# Patient Record
Sex: FEMALE | Race: WHITE | NOT HISPANIC OR LATINO
[De-identification: names, ages, dates, MRNs, and addresses within clinical notes are randomized per-mention and may not be internally consistent; named-entity substitution may affect disease eponyms.]

---

## 2022-04-22 PROBLEM — Z00.00 ENCOUNTER FOR PREVENTIVE HEALTH EXAMINATION: Status: ACTIVE | Noted: 2022-04-22

## 2022-04-25 ENCOUNTER — APPOINTMENT (OUTPATIENT)
Dept: GYNECOLOGIC ONCOLOGY | Facility: CLINIC | Age: 72
End: 2022-04-25
Payer: MEDICARE

## 2022-04-25 VITALS
OXYGEN SATURATION: 98 % | BODY MASS INDEX: 24.77 KG/M2 | HEIGHT: 58 IN | DIASTOLIC BLOOD PRESSURE: 66 MMHG | SYSTOLIC BLOOD PRESSURE: 135 MMHG | WEIGHT: 118 LBS | TEMPERATURE: 97.2 F | HEART RATE: 65 BPM

## 2022-04-25 DIAGNOSIS — Z76.89 PERSONS ENCOUNTERING HEALTH SERVICES IN OTHER SPECIFIED CIRCUMSTANCES: ICD-10-CM

## 2022-04-25 DIAGNOSIS — Z78.9 OTHER SPECIFIED HEALTH STATUS: ICD-10-CM

## 2022-04-25 DIAGNOSIS — Z87.891 PERSONAL HISTORY OF NICOTINE DEPENDENCE: ICD-10-CM

## 2022-04-25 PROCEDURE — 99205 OFFICE O/P NEW HI 60 MIN: CPT | Mod: 25

## 2022-04-25 PROCEDURE — 57100 BIOPSY VAGINAL MUCOSA SIMPLE: CPT

## 2022-04-29 ENCOUNTER — OUTPATIENT (OUTPATIENT)
Dept: OUTPATIENT SERVICES | Facility: HOSPITAL | Age: 72
LOS: 1 days | End: 2022-04-29
Payer: MEDICAID

## 2022-04-29 DIAGNOSIS — C52 MALIGNANT NEOPLASM OF VAGINA: ICD-10-CM

## 2022-05-03 ENCOUNTER — RESULT REVIEW (OUTPATIENT)
Age: 72
End: 2022-05-03

## 2022-05-03 LAB — CORE LAB BIOPSY: NORMAL

## 2022-05-03 PROCEDURE — 88321 CONSLTJ&REPRT SLD PREP ELSWR: CPT

## 2022-05-04 ENCOUNTER — RESULT REVIEW (OUTPATIENT)
Age: 72
End: 2022-05-04

## 2022-05-04 PROCEDURE — 88321 CONSLTJ&REPRT SLD PREP ELSWR: CPT

## 2022-05-05 LAB — SURGICAL PATHOLOGY STUDY: SIGNIFICANT CHANGE UP

## 2022-05-10 ENCOUNTER — APPOINTMENT (OUTPATIENT)
Dept: NUCLEAR MEDICINE | Facility: CLINIC | Age: 72
End: 2022-05-10
Payer: MEDICARE

## 2022-05-10 ENCOUNTER — OUTPATIENT (OUTPATIENT)
Dept: OUTPATIENT SERVICES | Facility: HOSPITAL | Age: 72
LOS: 1 days | End: 2022-05-10

## 2022-05-10 PROCEDURE — 78815 PET IMAGE W/CT SKULL-THIGH: CPT | Mod: 26,PI

## 2022-05-16 ENCOUNTER — APPOINTMENT (OUTPATIENT)
Dept: GYNECOLOGIC ONCOLOGY | Facility: CLINIC | Age: 72
End: 2022-05-16
Payer: MEDICARE

## 2022-05-16 PROCEDURE — 99214 OFFICE O/P EST MOD 30 MIN: CPT

## 2022-05-17 ENCOUNTER — NON-APPOINTMENT (OUTPATIENT)
Age: 72
End: 2022-05-17

## 2022-05-21 ENCOUNTER — OUTPATIENT (OUTPATIENT)
Dept: OUTPATIENT SERVICES | Facility: HOSPITAL | Age: 72
LOS: 1 days | Discharge: HOME | End: 2022-05-21
Payer: MEDICARE

## 2022-05-21 DIAGNOSIS — C52 MALIGNANT NEOPLASM OF VAGINA: ICD-10-CM

## 2022-05-24 ENCOUNTER — APPOINTMENT (OUTPATIENT)
Dept: HEMATOLOGY ONCOLOGY | Facility: CLINIC | Age: 72
End: 2022-05-24
Payer: MEDICARE

## 2022-05-24 ENCOUNTER — RESULT REVIEW (OUTPATIENT)
Age: 72
End: 2022-05-24

## 2022-05-24 ENCOUNTER — NON-APPOINTMENT (OUTPATIENT)
Age: 72
End: 2022-05-24

## 2022-05-24 VITALS
HEART RATE: 76 BPM | SYSTOLIC BLOOD PRESSURE: 154 MMHG | RESPIRATION RATE: 16 BRPM | WEIGHT: 118.23 LBS | TEMPERATURE: 98 F | BODY MASS INDEX: 24.82 KG/M2 | DIASTOLIC BLOOD PRESSURE: 75 MMHG | HEIGHT: 57.99 IN | OXYGEN SATURATION: 96 %

## 2022-05-24 LAB
BASOPHILS # BLD AUTO: 0.04 K/UL — SIGNIFICANT CHANGE UP (ref 0–0.2)
BASOPHILS NFR BLD AUTO: 0.8 % — SIGNIFICANT CHANGE UP (ref 0–2)
EOSINOPHIL # BLD AUTO: 0.21 K/UL — SIGNIFICANT CHANGE UP (ref 0–0.5)
EOSINOPHIL NFR BLD AUTO: 4.2 % — SIGNIFICANT CHANGE UP (ref 0–6)
HCT VFR BLD CALC: 37.6 % — SIGNIFICANT CHANGE UP (ref 34.5–45)
HGB BLD-MCNC: 12 G/DL — SIGNIFICANT CHANGE UP (ref 11.5–15.5)
IMM GRANULOCYTES NFR BLD AUTO: 0.2 % — SIGNIFICANT CHANGE UP (ref 0–1.5)
LYMPHOCYTES # BLD AUTO: 1.48 K/UL — SIGNIFICANT CHANGE UP (ref 1–3.3)
LYMPHOCYTES # BLD AUTO: 29.7 % — SIGNIFICANT CHANGE UP (ref 13–44)
MCHC RBC-ENTMCNC: 30.8 PG — SIGNIFICANT CHANGE UP (ref 27–34)
MCHC RBC-ENTMCNC: 31.9 G/DL — LOW (ref 32–36)
MCV RBC AUTO: 96.4 FL — SIGNIFICANT CHANGE UP (ref 80–100)
MONOCYTES # BLD AUTO: 0.45 K/UL — SIGNIFICANT CHANGE UP (ref 0–0.9)
MONOCYTES NFR BLD AUTO: 9 % — SIGNIFICANT CHANGE UP (ref 2–14)
NEUTROPHILS # BLD AUTO: 2.8 K/UL — SIGNIFICANT CHANGE UP (ref 1.8–7.4)
NEUTROPHILS NFR BLD AUTO: 56.1 % — SIGNIFICANT CHANGE UP (ref 43–77)
NRBC # BLD: 0 /100 WBCS — SIGNIFICANT CHANGE UP (ref 0–0)
PLATELET # BLD AUTO: 141 K/UL — LOW (ref 150–400)
RBC # BLD: 3.9 M/UL — SIGNIFICANT CHANGE UP (ref 3.8–5.2)
RBC # FLD: 12.6 % — SIGNIFICANT CHANGE UP (ref 10.3–14.5)
WBC # BLD: 4.99 K/UL — SIGNIFICANT CHANGE UP (ref 3.8–10.5)
WBC # FLD AUTO: 4.99 K/UL — SIGNIFICANT CHANGE UP (ref 3.8–10.5)

## 2022-05-24 PROCEDURE — 93010 ELECTROCARDIOGRAM REPORT: CPT

## 2022-05-24 PROCEDURE — 99205 OFFICE O/P NEW HI 60 MIN: CPT

## 2022-05-24 RX ORDER — POLYETHYLENE GLYCOL-3350 AND ELECTROLYTES WITH FLAVOR PACK 240; 5.84; 2.98; 6.72; 22.72 G/278.26G; G/278.26G; G/278.26G; G/278.26G; G/278.26G
240 POWDER, FOR SOLUTION ORAL
Qty: 4000 | Refills: 0 | Status: DISCONTINUED | COMMUNITY
Start: 2022-02-27 | End: 2022-05-24

## 2022-05-24 RX ORDER — CIPROFLOXACIN HYDROCHLORIDE 500 MG/1
500 TABLET, FILM COATED ORAL
Qty: 10 | Refills: 0 | Status: COMPLETED | COMMUNITY
Start: 2022-03-24 | End: 2022-05-24

## 2022-05-25 LAB
ALBUMIN SERPL ELPH-MCNC: 4.5 G/DL
ALP BLD-CCNC: 50 U/L
ALT SERPL-CCNC: 27 U/L
ANION GAP SERPL CALC-SCNC: 12 MMOL/L
APTT BLD: 32.5 SEC
AST SERPL-CCNC: 32 U/L
BILIRUB SERPL-MCNC: 0.4 MG/DL
BUN SERPL-MCNC: 12 MG/DL
CALCIUM SERPL-MCNC: 9.4 MG/DL
CHLORIDE SERPL-SCNC: 104 MMOL/L
CO2 SERPL-SCNC: 24 MMOL/L
CREAT SERPL-MCNC: 0.69 MG/DL
EGFR: 94 ML/MIN/1.73M2
GLUCOSE SERPL-MCNC: 95 MG/DL
HAV IGM SER QL: NONREACTIVE
HBV CORE IGM SER QL: NONREACTIVE
HBV SURFACE AG SER QL: NONREACTIVE
HCV AB SER QL: NONREACTIVE
HCV S/CO RATIO: 0.35 S/CO
INR PPP: 0.93 RATIO
MAGNESIUM SERPL-MCNC: 2.2 MG/DL
POTASSIUM SERPL-SCNC: 4.2 MMOL/L
PROT SERPL-MCNC: 6.9 G/DL
PT BLD: 11 SEC
SODIUM SERPL-SCNC: 140 MMOL/L

## 2022-05-25 NOTE — HISTORY OF PRESENT ILLNESS
[Disease: _____________________] : Disease: [unfilled] [AJCC Stage: ____] : AJCC Stage: [unfilled] [de-identified] : Latasha is a 71 year old woman referred by Dr. Loredo for newly diagnosed vaginal cancer.\par In February 2022, she had spotting. She went for annual check up. She had colonoscopy and mammogram. Spotting continued, and she saw Gyn (Dr. More). He did biopsy and referred her Dr. Loredo.\par She had another biopsy and PET was obtained.\par \par 4/19/22 CT Abdomen/Pelvis \par Heterogeneously enhancing mass in the vagina measuring 5.3 x 2.2 x 3.6 cm. 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 No pain. No bowel or bladder issues.\par Appetite is normal. No change in weight.\par \par No significant past medical history.\par No known family history.\par \par Last gyn exam was in 1997. She had hysterectomy in 1997 for fibroids.\par , no children.  She has 9 siblings.\par Ex- smoker and drinking many years ago. \par She works from home. \par  [de-identified] : Patient has some vaginal staining, no pain.\par No recent weight loss or gain.\par No bowel or bladder issues.

## 2022-05-25 NOTE — CONSULT LETTER
[Dear  ___] : Dear  [unfilled], [Consult Letter:] : I had the pleasure of evaluating your patient, [unfilled]. [Consult Closing:] : Thank you very much for allowing me to participate in the care of this patient.  If you have any questions, please do not hesitate to contact me. [Sincerely,] : Sincerely, [DrSouth  ___] : Dr. HILL

## 2022-05-25 NOTE — REASON FOR VISIT
[Initial Consultation] : an initial consultation [Other: _____] : [unfilled] [FreeTextEntry2] : vaginal cancer.

## 2022-05-26 ENCOUNTER — APPOINTMENT (OUTPATIENT)
Dept: RADIATION ONCOLOGY | Facility: CLINIC | Age: 72
End: 2022-05-26
Payer: MEDICARE

## 2022-05-26 ENCOUNTER — OUTPATIENT (OUTPATIENT)
Dept: OUTPATIENT SERVICES | Facility: HOSPITAL | Age: 72
LOS: 1 days | Discharge: HOME | End: 2022-05-26
Payer: MEDICARE

## 2022-05-26 VITALS
SYSTOLIC BLOOD PRESSURE: 154 MMHG | WEIGHT: 121.25 LBS | TEMPERATURE: 98 F | BODY MASS INDEX: 25.35 KG/M2 | RESPIRATION RATE: 16 BRPM | OXYGEN SATURATION: 99 % | DIASTOLIC BLOOD PRESSURE: 73 MMHG | HEART RATE: 68 BPM

## 2022-05-26 DIAGNOSIS — Z78.9 OTHER SPECIFIED HEALTH STATUS: ICD-10-CM

## 2022-05-26 DIAGNOSIS — Z87.42 PERSONAL HISTORY OF OTHER DISEASES OF THE FEMALE GENITAL TRACT: ICD-10-CM

## 2022-05-26 PROCEDURE — 77263 THER RADIOLOGY TX PLNG CPLX: CPT

## 2022-05-26 PROCEDURE — 99205 OFFICE O/P NEW HI 60 MIN: CPT | Mod: 25,GC

## 2022-05-26 NOTE — REASON FOR VISIT
[Other: _____] : [unfilled] [Consideration of Curative Therapy] : consideration of curative therapy for [Other: ___] : [unfilled]

## 2022-05-27 NOTE — END OF VISIT
[] : Resident [FreeTextEntry3] : I saw and examined this patient on the date of service with my assigned resident physician, Dr. Eliza Osborn. I was involved in all procedures and radiographic assessments. I personally confirmed pertinent history and exam findings and reviewed the patient's diagnosis and plan with them.\par \par  [FreeTextEntry2] : 71F with vaginal cancer, at least 4 cm lesion at introitus to mid vagina on right wall to rectovaginal septum. SCC noted on a vaginal biopsy 4/5/22, well-to-moderately differentiated (previously on outside biopsy from 4/14/22, was felt to be SCC on review). CT A/P 4/14/22 showed a 5.3 x 2.2 x 3.6 cm mass in the vagina. PET/CT 5/10/22 showed SUV 13.0 avidity in vagina. No avid LAD or distant disease. MR staging pending. \par \par We would recommend treatment with definitive chemoradiation and discussed external beam and brachytherapy approaches, as well as the risks and benefits associated with these treatments. We will make sure she has the MRI for baseline staging and would repeat it on therapy to assess response and determine method of boost at the end (brachytherapy/external beam approach). At this time she would need either an interstitial or external beam boost, but we will reassess. She wishes to proceed; informed consent obtained and we will schedule her for simulation and follow up on MRI. We will coordinate with her medical oncologist.

## 2022-05-27 NOTE — DISEASE MANAGEMENT
[Clinical] : TNM Stage: c [IB] : IB [FreeTextEntry4] : at least FIGO IB; pending MRI staging [TTNM] : 1b [NTNM] : 0 [MTNM] : 0

## 2022-05-27 NOTE — HISTORY OF PRESENT ILLNESS
[FreeTextEntry1] : Latasha Martin is a 72yo F w/ history of hysterectomy 40 years ago in the [facility], now presenting with vaginal mass, SCC on biopsy, cT1b at least Stage IB (MR staging pending). Patient presents to discuss radiation therapy.\par \par Brief Oncological History:\par In 2/2022, she noticed mucous vaginal discharge that was blood tinged and subsequently had PMB. No GYN care since hysteroctomy 40 yers ago.\par \par Patient seen by outside GYN and biopsy of vaginal mass showing fragments of  dysplastic, dyskeratotic squamous epithelium,\par consistent with keratinizing squamous cell carcinoma. Pathology reviewed at [facility]. \par \par 4/19/22 - CT A/P showing heterogeneously enhancing mass in the vagina measuring 5.3 x 2.2 x 3.6 cm. This could be the redundant vaginal mucosa itself that is abnormally thickened and not necessarily an intraluminal mass. \par \par Patient seen by Dr. Loredo and additional biopsy taken on 4/25/2022, pathology showing SCC, well to moderately differentiated. \par \par PET CT on 5/10/22 showing increased FDG activity in the vagina, not well delineated on CT in the absence of intravenous contrast, with no evidence of metastatic disease.\par \par MR Pelvis ordered by GynOnc for RT planning. Not yet scheduled.\par \par Today:\par Notes pink vaginal discharge. Uses 2-3 pads/day. Denies abdominal pain, dysuria or bowel complaints.

## 2022-05-27 NOTE — REVIEW OF SYSTEMS
[Nocturia] : nocturia [Vaginal Discharge] : vaginal discharge [Dysmenorrhea/Abn Vaginal Bleeding] : dysmenorrhea/abnormal vaginal bleeding [Anxiety] : anxiety [Negative] : Heme/Lymph [Eye Pain] : no eye pain [Visual Disturbances] : no visual disturbances [Urinary Frequency] : no change in urinary frequency [Confused] : no confusion [Dizziness] : no dizziness [FreeTextEntry8] : pink vaginal discharge

## 2022-05-27 NOTE — PHYSICAL EXAM
[Normal] : well developed, well nourished, in no acute distress [Sclera] : the sclera and conjunctiva were normal [Outer Ear] : the ears and nose were normal in appearance [] : no respiratory distress [Heart Rate And Rhythm] : heart rate and rhythm were normal [Abdomen Soft] : soft [Abdomen Tenderness] : non-tender [Normal] : normal external genitalia [Inguinal Lymph Nodes Enlarged Bilaterally] : inguinal [Musculoskeletal - Swelling] : no joint swelling [Skin Color & Pigmentation] : normal skin color and pigmentation [No Focal Deficits] : no focal deficits [Oriented To Time, Place, And Person] : oriented to person, place, and time [Respiration, Rhythm And Depth] : normal respiratory rhythm and effort [Exaggerated Use Of Accessory Muscles For Inspiration] : no accessory muscle use [de-identified] : mass at introitus, palpable approx 4cm mass along the R vaginal wall. Unable to visualize the cervix on speculum exam

## 2022-06-07 PROCEDURE — 77470 SPECIAL RADIATION TREATMENT: CPT | Mod: 26

## 2022-06-07 PROCEDURE — 77332 RADIATION TREATMENT AID(S): CPT | Mod: 26

## 2022-06-12 ENCOUNTER — APPOINTMENT (OUTPATIENT)
Dept: MRI IMAGING | Facility: CLINIC | Age: 72
End: 2022-06-12
Payer: MEDICARE

## 2022-06-12 ENCOUNTER — OUTPATIENT (OUTPATIENT)
Dept: OUTPATIENT SERVICES | Facility: HOSPITAL | Age: 72
LOS: 1 days | End: 2022-06-12
Payer: MEDICARE

## 2022-06-12 DIAGNOSIS — C52 MALIGNANT NEOPLASM OF VAGINA: ICD-10-CM

## 2022-06-12 PROCEDURE — 72197 MRI PELVIS W/O & W/DYE: CPT

## 2022-06-12 PROCEDURE — 72197 MRI PELVIS W/O & W/DYE: CPT | Mod: 26

## 2022-06-12 PROCEDURE — A9585: CPT

## 2022-06-21 PROCEDURE — 77301 RADIOTHERAPY DOSE PLAN IMRT: CPT | Mod: 26

## 2022-06-21 PROCEDURE — 77300 RADIATION THERAPY DOSE PLAN: CPT | Mod: 26

## 2022-06-27 ENCOUNTER — RESULT REVIEW (OUTPATIENT)
Age: 72
End: 2022-06-27

## 2022-06-27 ENCOUNTER — APPOINTMENT (OUTPATIENT)
Dept: INFUSION THERAPY | Facility: HOSPITAL | Age: 72
End: 2022-06-27

## 2022-06-27 ENCOUNTER — NON-APPOINTMENT (OUTPATIENT)
Age: 72
End: 2022-06-27

## 2022-06-27 VITALS
DIASTOLIC BLOOD PRESSURE: 88 MMHG | OXYGEN SATURATION: 100 % | SYSTOLIC BLOOD PRESSURE: 156 MMHG | RESPIRATION RATE: 16 BRPM | HEART RATE: 64 BPM

## 2022-06-27 LAB
ALBUMIN SERPL ELPH-MCNC: 4.4 G/DL — SIGNIFICANT CHANGE UP (ref 3.3–5)
ALP SERPL-CCNC: 45 U/L — SIGNIFICANT CHANGE UP (ref 40–120)
ALT FLD-CCNC: 18 U/L — SIGNIFICANT CHANGE UP (ref 10–45)
ANION GAP SERPL CALC-SCNC: 12 MMOL/L — SIGNIFICANT CHANGE UP (ref 5–17)
AST SERPL-CCNC: 38 U/L — SIGNIFICANT CHANGE UP (ref 10–40)
BASOPHILS # BLD AUTO: 0.05 K/UL — SIGNIFICANT CHANGE UP (ref 0–0.2)
BASOPHILS NFR BLD AUTO: 1.1 % — SIGNIFICANT CHANGE UP (ref 0–2)
BILIRUB SERPL-MCNC: 0.3 MG/DL — SIGNIFICANT CHANGE UP (ref 0.2–1.2)
BUN SERPL-MCNC: 14 MG/DL — SIGNIFICANT CHANGE UP (ref 7–23)
CALCIUM SERPL-MCNC: 9.3 MG/DL — SIGNIFICANT CHANGE UP (ref 8.4–10.5)
CHLORIDE SERPL-SCNC: 105 MMOL/L — SIGNIFICANT CHANGE UP (ref 96–108)
CO2 SERPL-SCNC: 22 MMOL/L — SIGNIFICANT CHANGE UP (ref 22–31)
CREAT SERPL-MCNC: 0.67 MG/DL — SIGNIFICANT CHANGE UP (ref 0.5–1.3)
EGFR: 95 ML/MIN/1.73M2 — SIGNIFICANT CHANGE UP
EOSINOPHIL # BLD AUTO: 0.21 K/UL — SIGNIFICANT CHANGE UP (ref 0–0.5)
EOSINOPHIL NFR BLD AUTO: 4.4 % — SIGNIFICANT CHANGE UP (ref 0–6)
GLUCOSE SERPL-MCNC: 91 MG/DL — SIGNIFICANT CHANGE UP (ref 70–99)
HCT VFR BLD CALC: 36.8 % — SIGNIFICANT CHANGE UP (ref 34.5–45)
HGB BLD-MCNC: 11.9 G/DL — SIGNIFICANT CHANGE UP (ref 11.5–15.5)
IMM GRANULOCYTES NFR BLD AUTO: 0.2 % — SIGNIFICANT CHANGE UP (ref 0–1.5)
LYMPHOCYTES # BLD AUTO: 1.53 K/UL — SIGNIFICANT CHANGE UP (ref 1–3.3)
LYMPHOCYTES # BLD AUTO: 32.2 % — SIGNIFICANT CHANGE UP (ref 13–44)
MAGNESIUM SERPL-MCNC: 2.2 MG/DL — SIGNIFICANT CHANGE UP (ref 1.6–2.6)
MCHC RBC-ENTMCNC: 30.5 PG — SIGNIFICANT CHANGE UP (ref 27–34)
MCHC RBC-ENTMCNC: 32.3 G/DL — SIGNIFICANT CHANGE UP (ref 32–36)
MCV RBC AUTO: 94.4 FL — SIGNIFICANT CHANGE UP (ref 80–100)
MONOCYTES # BLD AUTO: 0.31 K/UL — SIGNIFICANT CHANGE UP (ref 0–0.9)
MONOCYTES NFR BLD AUTO: 6.5 % — SIGNIFICANT CHANGE UP (ref 2–14)
NEUTROPHILS # BLD AUTO: 2.64 K/UL — SIGNIFICANT CHANGE UP (ref 1.8–7.4)
NEUTROPHILS NFR BLD AUTO: 55.6 % — SIGNIFICANT CHANGE UP (ref 43–77)
NRBC # BLD: 0 /100 WBCS — SIGNIFICANT CHANGE UP (ref 0–0)
PLATELET # BLD AUTO: 138 K/UL — LOW (ref 150–400)
POTASSIUM SERPL-MCNC: 4.5 MMOL/L — SIGNIFICANT CHANGE UP (ref 3.5–5.3)
POTASSIUM SERPL-SCNC: 4.5 MMOL/L — SIGNIFICANT CHANGE UP (ref 3.5–5.3)
PROT SERPL-MCNC: 7.1 G/DL — SIGNIFICANT CHANGE UP (ref 6–8.3)
RBC # BLD: 3.9 M/UL — SIGNIFICANT CHANGE UP (ref 3.8–5.2)
RBC # FLD: 12.7 % — SIGNIFICANT CHANGE UP (ref 10.3–14.5)
SODIUM SERPL-SCNC: 140 MMOL/L — SIGNIFICANT CHANGE UP (ref 135–145)
WBC # BLD: 4.75 K/UL — SIGNIFICANT CHANGE UP (ref 3.8–10.5)
WBC # FLD AUTO: 4.75 K/UL — SIGNIFICANT CHANGE UP (ref 3.8–10.5)

## 2022-06-27 PROCEDURE — 77014: CPT | Mod: 26

## 2022-06-28 DIAGNOSIS — R11.2 NAUSEA WITH VOMITING, UNSPECIFIED: ICD-10-CM

## 2022-06-28 DIAGNOSIS — Z51.11 ENCOUNTER FOR ANTINEOPLASTIC CHEMOTHERAPY: ICD-10-CM

## 2022-06-28 DIAGNOSIS — E86.0 DEHYDRATION: ICD-10-CM

## 2022-06-28 PROCEDURE — 77014: CPT | Mod: 26

## 2022-06-29 ENCOUNTER — NON-APPOINTMENT (OUTPATIENT)
Age: 72
End: 2022-06-29

## 2022-06-29 VITALS
BODY MASS INDEX: 24.98 KG/M2 | OXYGEN SATURATION: 100 % | HEART RATE: 52 BPM | SYSTOLIC BLOOD PRESSURE: 169 MMHG | HEIGHT: 58 IN | WEIGHT: 119 LBS | DIASTOLIC BLOOD PRESSURE: 67 MMHG | RESPIRATION RATE: 16 BRPM

## 2022-06-29 PROCEDURE — 77014: CPT | Mod: 26

## 2022-06-29 NOTE — REVIEW OF SYSTEMS
[Diarrhea: Grade 0] : Diarrhea: Grade 0 [Fatigue: Grade 1 - Fatigue relieved by rest] : Fatigue: Grade 1 - Fatigue relieved by rest [Urinary Incontinence: Grade 0] : Urinary Incontinence: Grade 0  [Urinary Tract Pain: Grade 0] : Urinary Tract Pain: Grade 0 [Dermatitis Radiation: Grade 0] : Dermatitis Radiation: Grade 0

## 2022-06-29 NOTE — DISEASE MANAGEMENT
[Clinical] : TNM Stage: c [IB] : IB [FreeTextEntry4] : at least FIGO IB; pending MRI staging [TTNM] : 1b [NTNM] : 0 [MTNM] : 0 [de-identified] : 600 cGy [de-identified] : 6400 cGy [de-identified] : Pelvis/Vagina

## 2022-06-29 NOTE — HISTORY OF PRESENT ILLNESS
[FreeTextEntry1] : 72yo F w/ history of hysterectomy 40 years ago in the [facility], now presenting with vaginal mass, SCC on biopsy, cT1bN0 Stage IB \par CT A/P 4/14/22 showed a 5.3 x 2.2 x 3.6 cm mass in the vagina. \par PET/CT 5/10/22 showed SUV 13.0 avidity in vagina. No avid LAD or distant disease. \par MRI 6/12/22 shows a 3.6 x 3.3 x 2 cm lesion in the vagina only (T1b).\par \par Receiving concurrent chemo with Dr. Alexander( Mondays)\par \par 6/29/22: 3/32 fx: Notes pink/red mucousy vaginal discharge. Denies abdominal pain, dysuria or bowel complaints. Supportive meds & Sitz baths reviewed. Labs 6/27/22: HGB/HCT:11.9/36.8, WBC:4.75, Plts:138, ANC:2.64\par

## 2022-06-29 NOTE — PHYSICAL EXAM
[General Appearance - Alert] : alert [General Appearance - In No Acute Distress] : in no acute distress [] : no respiratory distress [Respiration, Rhythm And Depth] : normal respiratory rhythm and effort [Exaggerated Use Of Accessory Muscles For Inspiration] : no accessory muscle use [Abdomen Soft] : soft [Skin Color & Pigmentation] : normal skin color and pigmentation [Oriented To Time, Place, And Person] : oriented to person, place, and time [General Appearance - Well Developed] : well developed [Nondistended] : nondistended

## 2022-06-30 PROCEDURE — G6002: CPT | Mod: 26

## 2022-07-01 PROCEDURE — G6002: CPT | Mod: 26

## 2022-07-01 PROCEDURE — 77427 RADIATION TX MANAGEMENT X5: CPT

## 2022-07-05 ENCOUNTER — APPOINTMENT (OUTPATIENT)
Dept: HEMATOLOGY ONCOLOGY | Facility: CLINIC | Age: 72
End: 2022-07-05

## 2022-07-05 ENCOUNTER — RESULT REVIEW (OUTPATIENT)
Age: 72
End: 2022-07-05

## 2022-07-05 ENCOUNTER — APPOINTMENT (OUTPATIENT)
Dept: INFUSION THERAPY | Facility: HOSPITAL | Age: 72
End: 2022-07-05

## 2022-07-05 LAB
ALBUMIN SERPL ELPH-MCNC: 4.1 G/DL — SIGNIFICANT CHANGE UP (ref 3.3–5)
ALP SERPL-CCNC: 68 U/L — SIGNIFICANT CHANGE UP (ref 40–120)
ALT FLD-CCNC: 76 U/L — HIGH (ref 10–45)
ANION GAP SERPL CALC-SCNC: 12 MMOL/L — SIGNIFICANT CHANGE UP (ref 5–17)
AST SERPL-CCNC: 58 U/L — HIGH (ref 10–40)
BASOPHILS # BLD AUTO: 0.02 K/UL — SIGNIFICANT CHANGE UP (ref 0–0.2)
BASOPHILS NFR BLD AUTO: 0.6 % — SIGNIFICANT CHANGE UP (ref 0–2)
BILIRUB SERPL-MCNC: 0.4 MG/DL — SIGNIFICANT CHANGE UP (ref 0.2–1.2)
BUN SERPL-MCNC: 13 MG/DL — SIGNIFICANT CHANGE UP (ref 7–23)
CALCIUM SERPL-MCNC: 9.1 MG/DL — SIGNIFICANT CHANGE UP (ref 8.4–10.5)
CHLORIDE SERPL-SCNC: 99 MMOL/L — SIGNIFICANT CHANGE UP (ref 96–108)
CO2 SERPL-SCNC: 23 MMOL/L — SIGNIFICANT CHANGE UP (ref 22–31)
CREAT SERPL-MCNC: 0.67 MG/DL — SIGNIFICANT CHANGE UP (ref 0.5–1.3)
EGFR: 95 ML/MIN/1.73M2 — SIGNIFICANT CHANGE UP
EOSINOPHIL # BLD AUTO: 0.09 K/UL — SIGNIFICANT CHANGE UP (ref 0–0.5)
EOSINOPHIL NFR BLD AUTO: 2.5 % — SIGNIFICANT CHANGE UP (ref 0–6)
GLUCOSE SERPL-MCNC: 101 MG/DL — HIGH (ref 70–99)
HCT VFR BLD CALC: 36.3 % — SIGNIFICANT CHANGE UP (ref 34.5–45)
HGB BLD-MCNC: 11.8 G/DL — SIGNIFICANT CHANGE UP (ref 11.5–15.5)
IMM GRANULOCYTES NFR BLD AUTO: 0.6 % — SIGNIFICANT CHANGE UP (ref 0–1.5)
LYMPHOCYTES # BLD AUTO: 0.5 K/UL — LOW (ref 1–3.3)
LYMPHOCYTES # BLD AUTO: 13.8 % — SIGNIFICANT CHANGE UP (ref 13–44)
MAGNESIUM SERPL-MCNC: 2 MG/DL — SIGNIFICANT CHANGE UP (ref 1.6–2.6)
MCHC RBC-ENTMCNC: 30.4 PG — SIGNIFICANT CHANGE UP (ref 27–34)
MCHC RBC-ENTMCNC: 32.5 G/DL — SIGNIFICANT CHANGE UP (ref 32–36)
MCV RBC AUTO: 93.6 FL — SIGNIFICANT CHANGE UP (ref 80–100)
MONOCYTES # BLD AUTO: 0.41 K/UL — SIGNIFICANT CHANGE UP (ref 0–0.9)
MONOCYTES NFR BLD AUTO: 11.3 % — SIGNIFICANT CHANGE UP (ref 2–14)
NEUTROPHILS # BLD AUTO: 2.58 K/UL — SIGNIFICANT CHANGE UP (ref 1.8–7.4)
NEUTROPHILS NFR BLD AUTO: 71.2 % — SIGNIFICANT CHANGE UP (ref 43–77)
NRBC # BLD: 0 /100 WBCS — SIGNIFICANT CHANGE UP (ref 0–0)
PLATELET # BLD AUTO: 115 K/UL — LOW (ref 150–400)
POTASSIUM SERPL-MCNC: 4.3 MMOL/L — SIGNIFICANT CHANGE UP (ref 3.5–5.3)
POTASSIUM SERPL-SCNC: 4.3 MMOL/L — SIGNIFICANT CHANGE UP (ref 3.5–5.3)
PROT SERPL-MCNC: 6.8 G/DL — SIGNIFICANT CHANGE UP (ref 6–8.3)
RBC # BLD: 3.88 M/UL — SIGNIFICANT CHANGE UP (ref 3.8–5.2)
RBC # FLD: 12.5 % — SIGNIFICANT CHANGE UP (ref 10.3–14.5)
SODIUM SERPL-SCNC: 134 MMOL/L — LOW (ref 135–145)
WBC # BLD: 3.62 K/UL — LOW (ref 3.8–10.5)
WBC # FLD AUTO: 3.62 K/UL — LOW (ref 3.8–10.5)

## 2022-07-05 PROCEDURE — 99214 OFFICE O/P EST MOD 30 MIN: CPT

## 2022-07-05 PROCEDURE — G6002: CPT | Mod: 26

## 2022-07-05 NOTE — HISTORY OF PRESENT ILLNESS
[Disease: _____________________] : Disease: [unfilled] [AJCC Stage: ____] : AJCC Stage: [unfilled] [de-identified] : Latasha is a 71 year old woman referred by Dr. Loredo for newly diagnosed vaginal cancer.\par In February 2022, she had spotting. She went for annual check up. She had colonoscopy and mammogram. Spotting continued, and she saw Gyn (Dr. More). He did biopsy and referred her Dr. Loredo.\par She had another biopsy and PET was obtained.\par \par 4/19/22 CT Abdomen/Pelvis \par Heterogeneously enhancing mass in the vagina measuring 5.3 x 2.2 x 3.6 cm. 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 No pain. No bowel or bladder issues.\par Appetite is normal. No change in weight.\par \par No significant past medical history.\par No known family history.\par \par Last gyn exam was in 1997. She had hysterectomy in 1997 for fibroids.\par , no children.  She has 9 siblings.\par Ex- smoker and drinking many years ago. \par She works from home. \par  [de-identified] : Patient here for a f/u visit. She had an episode of syncope last week. She fell and had LOC for few seconds, recovered fully. Today she feels well.\par Appetite is normal. Denies any bleeding. She has intermittent pain back of the head , from the fall.\par No swelling or lumps or bumps in the head. Denies any fever. [FreeTextEntry1] : Weekly Cisplatin and RT

## 2022-07-06 ENCOUNTER — NON-APPOINTMENT (OUTPATIENT)
Age: 72
End: 2022-07-06

## 2022-07-06 VITALS
WEIGHT: 123.13 LBS | SYSTOLIC BLOOD PRESSURE: 162 MMHG | HEIGHT: 58 IN | OXYGEN SATURATION: 99 % | DIASTOLIC BLOOD PRESSURE: 71 MMHG | BODY MASS INDEX: 25.85 KG/M2 | TEMPERATURE: 97.16 F | HEART RATE: 56 BPM | RESPIRATION RATE: 17 BRPM

## 2022-07-06 PROCEDURE — 77014: CPT | Mod: 26

## 2022-07-06 NOTE — REVIEW OF SYSTEMS
[Fatigue: Grade 1 - Fatigue relieved by rest] : Fatigue: Grade 1 - Fatigue relieved by rest [Urinary Incontinence: Grade 0] : Urinary Incontinence: Grade 0  [Urinary Tract Pain: Grade 0] : Urinary Tract Pain: Grade 0 [Dermatitis Radiation: Grade 0] : Dermatitis Radiation: Grade 0 [Constipation: Grade 1 - Occasional or intermittent symptoms; occasional use of stool softeners, laxatives, dietary modification, or enema] : Constipation: Grade 1 - Occasional or intermittent symptoms; occasional use of stool softeners, laxatives, dietary modification, or enema [Diarrhea: Grade 0] : Diarrhea: Grade 0 [Rectal Pain: Grade 0] : Rectal Pain: Grade 0 [Hematuria: Grade 0] : Hematuria: Grade 0

## 2022-07-06 NOTE — HISTORY OF PRESENT ILLNESS
[FreeTextEntry1] : 71F w/ history of hysterectomy 40 years ago in the [facility], now presenting with vaginal mass, SCC on biopsy, cT1bN0 Stage IB \par CT A/P 4/14/22 showed a 5.3 x 2.2 x 3.6 cm mass in the vagina. \par PET/CT 5/10/22 showed SUV 13.0 avidity in vagina. No avid LAD or distant disease. \par MRI 6/12/22 shows a 3.6 x 3.3 x 2 cm lesion in the vagina only (T1b).\par \par Receiving concurrent chemo with Dr. Alexander (Mondays)\par \par 6/29/22: 3/32 fx: Notes brown vaginal discharge. Denies abdominal pain, dysuria or bowel complaints. Supportive meds & Sitz baths reviewed. Labs 6/27/22: HGB/HCT:11.9/36.8, WBC:4.75, Plts:138, ANC:2.64\par \par 7/6/22: 7/32 fx: Notes syncopal episode last Saturday. Felt possibly related to dehydration. Denies chest pain or SOB. Has brown vaginal discharge. Denies abdominal pain or dysuria. Labs 7/5/22: HGB/HCT:11.8/36.3, WBC 3.62:, Plts:115, ANC:2.58

## 2022-07-06 NOTE — PHYSICAL EXAM
[General Appearance - Well Developed] : well developed [General Appearance - Alert] : alert [General Appearance - In No Acute Distress] : in no acute distress [] : no respiratory distress [Respiration, Rhythm And Depth] : normal respiratory rhythm and effort [Exaggerated Use Of Accessory Muscles For Inspiration] : no accessory muscle use [Abdomen Soft] : soft [Nondistended] : nondistended [Skin Color & Pigmentation] : normal skin color and pigmentation [Oriented To Time, Place, And Person] : oriented to person, place, and time [de-identified] : No erythema

## 2022-07-06 NOTE — DISEASE MANAGEMENT
[Clinical] : TNM Stage: c [IB] : IB [FreeTextEntry4] : at least FIGO IB; pending MRI staging [TTNM] : 1b [NTNM] : 0 [MTNM] : 0 [de-identified] : 1400 cGy [de-identified] : 6400 cGy [de-identified] : Pelvis/Vagina

## 2022-07-07 PROCEDURE — G6002: CPT | Mod: 26

## 2022-07-08 PROCEDURE — G6002: CPT | Mod: 26

## 2022-07-11 ENCOUNTER — RESULT REVIEW (OUTPATIENT)
Age: 72
End: 2022-07-11

## 2022-07-11 ENCOUNTER — APPOINTMENT (OUTPATIENT)
Dept: INFUSION THERAPY | Facility: HOSPITAL | Age: 72
End: 2022-07-11

## 2022-07-11 LAB
ALBUMIN SERPL ELPH-MCNC: 4 G/DL — SIGNIFICANT CHANGE UP (ref 3.3–5)
ALP SERPL-CCNC: 58 U/L — SIGNIFICANT CHANGE UP (ref 40–120)
ALT FLD-CCNC: 34 U/L — SIGNIFICANT CHANGE UP (ref 10–45)
ANION GAP SERPL CALC-SCNC: 10 MMOL/L — SIGNIFICANT CHANGE UP (ref 5–17)
AST SERPL-CCNC: 24 U/L — SIGNIFICANT CHANGE UP (ref 10–40)
BASOPHILS # BLD AUTO: 0 K/UL — SIGNIFICANT CHANGE UP (ref 0–0.2)
BASOPHILS NFR BLD AUTO: 0 % — SIGNIFICANT CHANGE UP (ref 0–2)
BILIRUB SERPL-MCNC: 0.2 MG/DL — SIGNIFICANT CHANGE UP (ref 0.2–1.2)
BUN SERPL-MCNC: 9 MG/DL — SIGNIFICANT CHANGE UP (ref 7–23)
CALCIUM SERPL-MCNC: 8.7 MG/DL — SIGNIFICANT CHANGE UP (ref 8.4–10.5)
CHLORIDE SERPL-SCNC: 100 MMOL/L — SIGNIFICANT CHANGE UP (ref 96–108)
CO2 SERPL-SCNC: 24 MMOL/L — SIGNIFICANT CHANGE UP (ref 22–31)
CREAT SERPL-MCNC: 0.7 MG/DL — SIGNIFICANT CHANGE UP (ref 0.5–1.3)
EGFR: 94 ML/MIN/1.73M2 — SIGNIFICANT CHANGE UP
EOSINOPHIL # BLD AUTO: 0.08 K/UL — SIGNIFICANT CHANGE UP (ref 0–0.5)
EOSINOPHIL NFR BLD AUTO: 3 % — SIGNIFICANT CHANGE UP (ref 0–6)
GLUCOSE SERPL-MCNC: 91 MG/DL — SIGNIFICANT CHANGE UP (ref 70–99)
HCT VFR BLD CALC: 33.4 % — LOW (ref 34.5–45)
HGB BLD-MCNC: 11.2 G/DL — LOW (ref 11.5–15.5)
LYMPHOCYTES # BLD AUTO: 0.39 K/UL — LOW (ref 1–3.3)
LYMPHOCYTES # BLD AUTO: 14 % — SIGNIFICANT CHANGE UP (ref 13–44)
MAGNESIUM SERPL-MCNC: 2.1 MG/DL — SIGNIFICANT CHANGE UP (ref 1.6–2.6)
MCHC RBC-ENTMCNC: 30.8 PG — SIGNIFICANT CHANGE UP (ref 27–34)
MCHC RBC-ENTMCNC: 33.5 G/DL — SIGNIFICANT CHANGE UP (ref 32–36)
MCV RBC AUTO: 91.8 FL — SIGNIFICANT CHANGE UP (ref 80–100)
MONOCYTES # BLD AUTO: 0.31 K/UL — SIGNIFICANT CHANGE UP (ref 0–0.9)
MONOCYTES NFR BLD AUTO: 11 % — SIGNIFICANT CHANGE UP (ref 2–14)
NEUTROPHILS # BLD AUTO: 2 K/UL — SIGNIFICANT CHANGE UP (ref 1.8–7.4)
NEUTROPHILS NFR BLD AUTO: 72 % — SIGNIFICANT CHANGE UP (ref 43–77)
NRBC # BLD: 0 /100 — SIGNIFICANT CHANGE UP (ref 0–0)
NRBC # BLD: SIGNIFICANT CHANGE UP /100 WBCS (ref 0–0)
PLAT MORPH BLD: NORMAL — SIGNIFICANT CHANGE UP
PLATELET # BLD AUTO: 104 K/UL — LOW (ref 150–400)
POTASSIUM SERPL-MCNC: 4.6 MMOL/L — SIGNIFICANT CHANGE UP (ref 3.5–5.3)
POTASSIUM SERPL-SCNC: 4.6 MMOL/L — SIGNIFICANT CHANGE UP (ref 3.5–5.3)
PROT SERPL-MCNC: 6.6 G/DL — SIGNIFICANT CHANGE UP (ref 6–8.3)
RBC # BLD: 3.64 M/UL — LOW (ref 3.8–5.2)
RBC # FLD: 12 % — SIGNIFICANT CHANGE UP (ref 10.3–14.5)
RBC BLD AUTO: SIGNIFICANT CHANGE UP
SODIUM SERPL-SCNC: 134 MMOL/L — LOW (ref 135–145)
WBC # BLD: 2.78 K/UL — LOW (ref 3.8–10.5)
WBC # FLD AUTO: 2.78 K/UL — LOW (ref 3.8–10.5)

## 2022-07-11 PROCEDURE — 77427 RADIATION TX MANAGEMENT X5: CPT

## 2022-07-11 PROCEDURE — G6002: CPT | Mod: 26

## 2022-07-12 ENCOUNTER — NON-APPOINTMENT (OUTPATIENT)
Age: 72
End: 2022-07-12

## 2022-07-12 PROCEDURE — G6002: CPT | Mod: 26

## 2022-07-13 ENCOUNTER — NON-APPOINTMENT (OUTPATIENT)
Age: 72
End: 2022-07-13

## 2022-07-13 VITALS
SYSTOLIC BLOOD PRESSURE: 160 MMHG | DIASTOLIC BLOOD PRESSURE: 70 MMHG | OXYGEN SATURATION: 100 % | TEMPERATURE: 96.8 F | BODY MASS INDEX: 25.83 KG/M2 | RESPIRATION RATE: 16 BRPM | WEIGHT: 123.6 LBS | HEART RATE: 62 BPM

## 2022-07-13 PROCEDURE — 77014: CPT | Mod: 26

## 2022-07-13 NOTE — DISEASE MANAGEMENT
[Clinical] : TNM Stage: c [IB] : IB [FreeTextEntry4] : at least FIGO IB; pending MRI staging [TTNM] : 1b [NTNM] : 0 [MTNM] : 0 [de-identified] : 2400 cGy [de-identified] : 6400 cGy [de-identified] : Pelvis/Vagina

## 2022-07-13 NOTE — PHYSICAL EXAM
[General Appearance - Well Developed] : well developed [General Appearance - Alert] : alert [General Appearance - In No Acute Distress] : in no acute distress [] : no respiratory distress [Respiration, Rhythm And Depth] : normal respiratory rhythm and effort [Exaggerated Use Of Accessory Muscles For Inspiration] : no accessory muscle use [Abdomen Soft] : soft [Nondistended] : nondistended [Skin Color & Pigmentation] : normal skin color and pigmentation [Oriented To Time, Place, And Person] : oriented to person, place, and time [Normal External Genitalia] : normal external genitalia  [de-identified] : no desquamation [de-identified] : No erythema

## 2022-07-13 NOTE — HISTORY OF PRESENT ILLNESS
Number Of Freeze-Thaw Cycles: 3 freeze-thaw cycles Detail Level: Detailed Duration Of Freeze Thaw-Cycle (Seconds): 3 Show Applicator Variable?: Yes Post-Care Instructions: I reviewed with the patient in detail post-care instructions. Patient is to wear sunprotection, and avoid picking at any of the treated lesions. Pt may apply Vaseline to crusted or scabbing areas. [FreeTextEntry1] : 71F w/ history of hysterectomy 40 years ago in the [facility], now presenting with vaginal mass, SCC on biopsy, cT1bN0 Stage IB \par CT A/P 4/14/22 showed a 5.3 x 2.2 x 3.6 cm mass in the vagina. \par PET/CT 5/10/22 showed SUV 13.0 avidity in vagina. No avid LAD or distant disease. \par MRI 6/12/22 shows a 3.6 x 3.3 x 2 cm lesion in the vagina only (T1b).\par \par Receiving concurrent chemo with Dr. Alexander (Mondays)\par \par 6/29/22: 3/32 fx: Notes brown vaginal discharge. Denies abdominal pain, dysuria or bowel complaints. Supportive meds & Sitz baths reviewed. Labs 6/27/22: HGB/HCT:11.9/36.8, WBC:4.75, Plts:138, ANC:2.64\par \par 7/6/22: 7/32 fx: Notes syncopal episode last Saturday. Felt possibly related to dehydration. Denies chest pain or SOB. Has brown vaginal discharge. Denies abdominal pain or dysuria. Labs 7/5/22: HGB/HCT:11.8/36.3, WBC 3.62:, Plts:115, ANC:2.58\par \par 7/13/22: 11/32 fx: Fatigue. Appetite good. Weight stable. No further syncopal episodes or vaginal bleeding/discharge. No bowel issues.\par Labs 7/11/22: HGB/HCT:11.2/33.4, WBC:2.78, Plts:104, ANC:2\par  Consent: The patient's consent was obtained including but not limited to risks of crusting, scabbing, blistering, scarring, darker or lighter pigmentary change, recurrence, incomplete removal and infection. Render Note In Bullet Format When Appropriate: No

## 2022-07-14 PROCEDURE — G6002: CPT | Mod: 26

## 2022-07-15 PROCEDURE — G6002: CPT | Mod: 26

## 2022-07-18 ENCOUNTER — RESULT REVIEW (OUTPATIENT)
Age: 72
End: 2022-07-18

## 2022-07-18 ENCOUNTER — APPOINTMENT (OUTPATIENT)
Dept: HEMATOLOGY ONCOLOGY | Facility: CLINIC | Age: 72
End: 2022-07-18

## 2022-07-18 ENCOUNTER — APPOINTMENT (OUTPATIENT)
Dept: INFUSION THERAPY | Facility: HOSPITAL | Age: 72
End: 2022-07-18

## 2022-07-18 LAB
ALBUMIN SERPL ELPH-MCNC: 3.9 G/DL — SIGNIFICANT CHANGE UP (ref 3.3–5)
ALP SERPL-CCNC: 75 U/L — SIGNIFICANT CHANGE UP (ref 40–120)
ALT FLD-CCNC: 73 U/L — HIGH (ref 10–45)
ANION GAP SERPL CALC-SCNC: 12 MMOL/L — SIGNIFICANT CHANGE UP (ref 5–17)
AST SERPL-CCNC: 29 U/L — SIGNIFICANT CHANGE UP (ref 10–40)
BASOPHILS # BLD AUTO: 0.01 K/UL — SIGNIFICANT CHANGE UP (ref 0–0.2)
BASOPHILS NFR BLD AUTO: 0.3 % — SIGNIFICANT CHANGE UP (ref 0–2)
BILIRUB SERPL-MCNC: <0.2 MG/DL — SIGNIFICANT CHANGE UP (ref 0.2–1.2)
BUN SERPL-MCNC: 15 MG/DL — SIGNIFICANT CHANGE UP (ref 7–23)
CALCIUM SERPL-MCNC: 8.5 MG/DL — SIGNIFICANT CHANGE UP (ref 8.4–10.5)
CHLORIDE SERPL-SCNC: 101 MMOL/L — SIGNIFICANT CHANGE UP (ref 96–108)
CO2 SERPL-SCNC: 21 MMOL/L — LOW (ref 22–31)
CREAT SERPL-MCNC: 0.75 MG/DL — SIGNIFICANT CHANGE UP (ref 0.5–1.3)
EGFR: 86 ML/MIN/1.73M2 — SIGNIFICANT CHANGE UP
EOSINOPHIL # BLD AUTO: 0.06 K/UL — SIGNIFICANT CHANGE UP (ref 0–0.5)
EOSINOPHIL NFR BLD AUTO: 2.1 % — SIGNIFICANT CHANGE UP (ref 0–6)
GLUCOSE SERPL-MCNC: 105 MG/DL — HIGH (ref 70–99)
HCT VFR BLD CALC: 32.1 % — LOW (ref 34.5–45)
HGB BLD-MCNC: 10.5 G/DL — LOW (ref 11.5–15.5)
IMM GRANULOCYTES NFR BLD AUTO: 0.3 % — SIGNIFICANT CHANGE UP (ref 0–1.5)
LYMPHOCYTES # BLD AUTO: 0.22 K/UL — LOW (ref 1–3.3)
LYMPHOCYTES # BLD AUTO: 7.6 % — LOW (ref 13–44)
MAGNESIUM SERPL-MCNC: 1.9 MG/DL — SIGNIFICANT CHANGE UP (ref 1.6–2.6)
MCHC RBC-ENTMCNC: 30.6 PG — SIGNIFICANT CHANGE UP (ref 27–34)
MCHC RBC-ENTMCNC: 32.7 G/DL — SIGNIFICANT CHANGE UP (ref 32–36)
MCV RBC AUTO: 93.6 FL — SIGNIFICANT CHANGE UP (ref 80–100)
MONOCYTES # BLD AUTO: 0.28 K/UL — SIGNIFICANT CHANGE UP (ref 0–0.9)
MONOCYTES NFR BLD AUTO: 9.7 % — SIGNIFICANT CHANGE UP (ref 2–14)
NEUTROPHILS # BLD AUTO: 2.3 K/UL — SIGNIFICANT CHANGE UP (ref 1.8–7.4)
NEUTROPHILS NFR BLD AUTO: 80 % — HIGH (ref 43–77)
NRBC # BLD: 0 /100 WBCS — SIGNIFICANT CHANGE UP (ref 0–0)
PLATELET # BLD AUTO: 120 K/UL — LOW (ref 150–400)
POTASSIUM SERPL-MCNC: 4.8 MMOL/L — SIGNIFICANT CHANGE UP (ref 3.5–5.3)
POTASSIUM SERPL-SCNC: 4.8 MMOL/L — SIGNIFICANT CHANGE UP (ref 3.5–5.3)
PROT SERPL-MCNC: 6.1 G/DL — SIGNIFICANT CHANGE UP (ref 6–8.3)
RBC # BLD: 3.43 M/UL — LOW (ref 3.8–5.2)
RBC # FLD: 12.2 % — SIGNIFICANT CHANGE UP (ref 10.3–14.5)
SODIUM SERPL-SCNC: 135 MMOL/L — SIGNIFICANT CHANGE UP (ref 135–145)
WBC # BLD: 2.88 K/UL — LOW (ref 3.8–10.5)
WBC # FLD AUTO: 2.88 K/UL — LOW (ref 3.8–10.5)

## 2022-07-18 PROCEDURE — G6002: CPT | Mod: 26

## 2022-07-18 PROCEDURE — 99213 OFFICE O/P EST LOW 20 MIN: CPT

## 2022-07-18 PROCEDURE — 77427 RADIATION TX MANAGEMENT X5: CPT

## 2022-07-18 NOTE — REVIEW OF SYSTEMS
[Fatigue] : fatigue [Constipation] : constipation [Diarrhea] : diarrhea [Negative] : Allergic/Immunologic

## 2022-07-19 PROCEDURE — G6002: CPT | Mod: 26

## 2022-07-19 NOTE — REASON FOR VISIT
[Follow-Up Visit] : a follow-up [Pre-Treatment Visit] : a pre-treatment [FreeTextEntry2] : vaginal cancer.

## 2022-07-19 NOTE — HISTORY OF PRESENT ILLNESS
[Disease: _____________________] : Disease: [unfilled] [AJCC Stage: ____] : AJCC Stage: [unfilled] [de-identified] : Latasha is a 71 year old woman referred by Dr. Loredo for newly diagnosed vaginal cancer.\par In February 2022, she had spotting. She went for annual check up. She had colonoscopy and mammogram. Spotting continued, and she saw Gyn (Dr. More). He did biopsy and referred her Dr. Loredo.\par She had another biopsy and PET was obtained.\par \par 4/19/22 CT Abdomen/Pelvis \par Heterogeneously enhancing mass in the vagina measuring 5.3 x 2.2 x 3.6 cm. 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 No pain. No bowel or bladder issues.\par Appetite is normal. No change in weight.\par \par No significant past medical history.\par No known family history.\par \par Last gyn exam was in 1997. She had hysterectomy in 1997 for fibroids.\par , no children.  She has 9 siblings.\par Ex- smoker and drinking many years ago. \par She works from home. \par  [FreeTextEntry1] : Weekly Cisplatin and RT [de-identified] : Patient is here for follow up and treatment.  Overall she is feeling better this week.  She has both intermittent constipation and diarrhea.  She has been using miralax for constipation and immodium for diarrhea with relief.  She denies any pain.  Appetite is better, staying well hydrated.  Fatigue has increased but denies dizziness or falls. She denies fever, chills, chest pain, SOB, abdominal pain, nausea, vomiting, bleeding, neuropathy, tinnitus.

## 2022-07-19 NOTE — ASSESSMENT
[Curative] : Goals of care discussed with patient: Curative [Palliative Care Plan] : not applicable at this time [FreeTextEntry1] : 71 year old woman with vaginal cancer on treatment with RT and weekly Cisplatin.\par Overall tolerating treatment well.\par Reviewed bowel regimens for both constipation and diarrhea,  to call office with any changes.\par Check labs today - CBC, CMP, mg.\par RTC 2 weeks.

## 2022-07-20 ENCOUNTER — NON-APPOINTMENT (OUTPATIENT)
Age: 72
End: 2022-07-20

## 2022-07-20 ENCOUNTER — TRANSCRIPTION ENCOUNTER (OUTPATIENT)
Age: 72
End: 2022-07-20

## 2022-07-20 ENCOUNTER — OUTPATIENT (OUTPATIENT)
Dept: OUTPATIENT SERVICES | Facility: HOSPITAL | Age: 72
LOS: 1 days | Discharge: HOME | End: 2022-07-20

## 2022-07-20 DIAGNOSIS — Z90.710 ACQUIRED ABSENCE OF BOTH CERVIX AND UTERUS: Chronic | ICD-10-CM

## 2022-07-20 DIAGNOSIS — C52 MALIGNANT NEOPLASM OF VAGINA: ICD-10-CM

## 2022-07-20 PROCEDURE — 77014: CPT | Mod: 26

## 2022-07-20 NOTE — DISEASE MANAGEMENT
[Clinical] : TNM Stage: c [IB] : IB [FreeTextEntry4] : at least FIGO IB; pending MRI staging [TTNM] : 1b [NTNM] : 0 [MTNM] : 0 [de-identified] : 3400 cGy [de-identified] : 6400 cGy [de-identified] : Pelvis/Vagina

## 2022-07-20 NOTE — REVIEW OF SYSTEMS
[Constipation: Grade 0] : Constipation: Grade 0 [Diarrhea: Grade 1 - Increase of <4 stools per day over baseline; mild increase in ostomy output compared to baseline] : Diarrhea: Grade 1 - Increase of <4 stools per day over baseline; mild increase in ostomy output compared to baseline [Rectal Pain: Grade 0] : Rectal Pain: Grade 0 [Fatigue: Grade 1 - Fatigue relieved by rest] : Fatigue: Grade 1 - Fatigue relieved by rest [Hematuria: Grade 0] : Hematuria: Grade 0 [Urinary Incontinence: Grade 0] : Urinary Incontinence: Grade 0  [Urinary Tract Pain: Grade 0] : Urinary Tract Pain: Grade 0 [Dermatitis Radiation: Grade 0] : Dermatitis Radiation: Grade 0

## 2022-07-20 NOTE — PHYSICAL EXAM
[General Appearance - Well Developed] : well developed [General Appearance - Alert] : alert [General Appearance - In No Acute Distress] : in no acute distress [] : no respiratory distress [Respiration, Rhythm And Depth] : normal respiratory rhythm and effort [Exaggerated Use Of Accessory Muscles For Inspiration] : no accessory muscle use [Abdomen Soft] : soft [Nondistended] : nondistended [Normal External Genitalia] : normal external genitalia  [Skin Color & Pigmentation] : normal skin color and pigmentation [Oriented To Time, Place, And Person] : oriented to person, place, and time [FreeTextEntry1] : some perirectal irritation  [de-identified] : no desquamation [de-identified] : No erythema

## 2022-07-20 NOTE — HISTORY OF PRESENT ILLNESS
[FreeTextEntry1] : 71F w/ history of hysterectomy 40 years ago in the [facility], now presenting with vaginal mass, SCC on biopsy, cT1bN0 Stage IB \par CT A/P 4/14/22 showed a 5.3 x 2.2 x 3.6 cm mass in the vagina. \par PET/CT 5/10/22 showed SUV 13.0 avidity in vagina. No avid LAD or distant disease. \par MRI 6/12/22 shows a 3.6 x 3.3 x 2 cm lesion in the vagina only (T1b).\par \par Receiving concurrent chemo with Dr. Alexander (Mondays)\par \par 6/29/22: 3/32 fx: Notes brown vaginal discharge. Denies abdominal pain, dysuria or bowel complaints. Supportive meds & Sitz baths reviewed. Labs 6/27/22: HGB/HCT:11.9/36.8, WBC:4.75, Plts:138, ANC:2.64\par \par 7/6/22: 7/32 fx: Notes syncopal episode last Saturday. Felt possibly related to dehydration. Denies chest pain or SOB. Has brown vaginal discharge. Denies abdominal pain or dysuria. Labs 7/5/22: HGB/HCT:11.8/36.3, WBC 3.62:, Plts:115, ANC:2.58\par \par 7/13/22: 11/32 fx: Fatigue. Appetite good. Weight stable. No further syncopal episodes or vaginal bleeding/discharge. No bowel issues.\par Labs 7/11/22: HGB/HCT:11.2/33.4, WBC:2.78, Plts:104, ANC:2\par \par 7/20/2022:  Patient is seen for OTV today and is s/p 17/25 fx to vagina/pelvis.  She is eating well and has maintained her weight.  She denies any vaginal bleeding or discharge.  She had 5 watery BM on Sunday and Imodium helped and actually occurred after taking Miralax.  She denies any fevers/chills, syncopal episodes.   She continues on Cisplatin with Dr Blackwell.  She denies any urinary issues.   Labs from 7/18:  h/h 10.5/32.1, WBC 2.88, platelets 120, ANC 2.30\par

## 2022-07-21 PROCEDURE — G6002: CPT | Mod: 26

## 2022-07-22 PROCEDURE — G6002: CPT | Mod: 26

## 2022-07-25 ENCOUNTER — RESULT REVIEW (OUTPATIENT)
Age: 72
End: 2022-07-25

## 2022-07-25 ENCOUNTER — APPOINTMENT (OUTPATIENT)
Dept: INFUSION THERAPY | Facility: HOSPITAL | Age: 72
End: 2022-07-25

## 2022-07-25 LAB
ALBUMIN SERPL ELPH-MCNC: 3.7 G/DL — SIGNIFICANT CHANGE UP (ref 3.3–5)
ALP SERPL-CCNC: 69 U/L — SIGNIFICANT CHANGE UP (ref 40–120)
ALT FLD-CCNC: 30 U/L — SIGNIFICANT CHANGE UP (ref 10–45)
ANION GAP SERPL CALC-SCNC: 10 MMOL/L — SIGNIFICANT CHANGE UP (ref 5–17)
AST SERPL-CCNC: 31 U/L — SIGNIFICANT CHANGE UP (ref 10–40)
BASOPHILS # BLD AUTO: 0.03 K/UL — SIGNIFICANT CHANGE UP (ref 0–0.2)
BASOPHILS NFR BLD AUTO: 1 % — SIGNIFICANT CHANGE UP (ref 0–2)
BILIRUB SERPL-MCNC: 0.2 MG/DL — SIGNIFICANT CHANGE UP (ref 0.2–1.2)
BUN SERPL-MCNC: 11 MG/DL — SIGNIFICANT CHANGE UP (ref 7–23)
CALCIUM SERPL-MCNC: 8.6 MG/DL — SIGNIFICANT CHANGE UP (ref 8.4–10.5)
CHLORIDE SERPL-SCNC: 95 MMOL/L — LOW (ref 96–108)
CO2 SERPL-SCNC: 23 MMOL/L — SIGNIFICANT CHANGE UP (ref 22–31)
CREAT SERPL-MCNC: 0.62 MG/DL — SIGNIFICANT CHANGE UP (ref 0.5–1.3)
EGFR: 96 ML/MIN/1.73M2 — SIGNIFICANT CHANGE UP
EOSINOPHIL # BLD AUTO: 0.11 K/UL — SIGNIFICANT CHANGE UP (ref 0–0.5)
EOSINOPHIL NFR BLD AUTO: 4 % — SIGNIFICANT CHANGE UP (ref 0–6)
GLUCOSE SERPL-MCNC: 100 MG/DL — HIGH (ref 70–99)
HCT VFR BLD CALC: 28.8 % — LOW (ref 34.5–45)
HGB BLD-MCNC: 10 G/DL — LOW (ref 11.5–15.5)
LYMPHOCYTES # BLD AUTO: 0.22 K/UL — LOW (ref 1–3.3)
LYMPHOCYTES # BLD AUTO: 8 % — LOW (ref 13–44)
MAGNESIUM SERPL-MCNC: 1.8 MG/DL — SIGNIFICANT CHANGE UP (ref 1.6–2.6)
MCHC RBC-ENTMCNC: 30.9 PG — SIGNIFICANT CHANGE UP (ref 27–34)
MCHC RBC-ENTMCNC: 34.7 G/DL — SIGNIFICANT CHANGE UP (ref 32–36)
MCV RBC AUTO: 88.9 FL — SIGNIFICANT CHANGE UP (ref 80–100)
MONOCYTES # BLD AUTO: 0.39 K/UL — SIGNIFICANT CHANGE UP (ref 0–0.9)
MONOCYTES NFR BLD AUTO: 14 % — SIGNIFICANT CHANGE UP (ref 2–14)
NEUTROPHILS # BLD AUTO: 2.01 K/UL — SIGNIFICANT CHANGE UP (ref 1.8–7.4)
NEUTROPHILS NFR BLD AUTO: 73 % — SIGNIFICANT CHANGE UP (ref 43–77)
NRBC # BLD: 0 /100 — SIGNIFICANT CHANGE UP (ref 0–0)
NRBC # BLD: SIGNIFICANT CHANGE UP /100 WBCS (ref 0–0)
PLAT MORPH BLD: NORMAL — SIGNIFICANT CHANGE UP
PLATELET # BLD AUTO: 95 K/UL — LOW (ref 150–400)
POTASSIUM SERPL-MCNC: 4.8 MMOL/L — SIGNIFICANT CHANGE UP (ref 3.5–5.3)
POTASSIUM SERPL-SCNC: 4.8 MMOL/L — SIGNIFICANT CHANGE UP (ref 3.5–5.3)
PROT SERPL-MCNC: 6 G/DL — SIGNIFICANT CHANGE UP (ref 6–8.3)
RBC # BLD: 3.24 M/UL — LOW (ref 3.8–5.2)
RBC # FLD: 12.2 % — SIGNIFICANT CHANGE UP (ref 10.3–14.5)
RBC BLD AUTO: SIGNIFICANT CHANGE UP
SODIUM SERPL-SCNC: 128 MMOL/L — LOW (ref 135–145)
WBC # BLD: 2.76 K/UL — LOW (ref 3.8–10.5)
WBC # FLD AUTO: 2.76 K/UL — LOW (ref 3.8–10.5)

## 2022-07-25 PROCEDURE — 77427 RADIATION TX MANAGEMENT X5: CPT

## 2022-07-25 PROCEDURE — G6002: CPT | Mod: 26

## 2022-07-26 DIAGNOSIS — E86.0 DEHYDRATION: ICD-10-CM

## 2022-07-26 DIAGNOSIS — Z51.11 ENCOUNTER FOR ANTINEOPLASTIC CHEMOTHERAPY: ICD-10-CM

## 2022-07-26 DIAGNOSIS — R11.2 NAUSEA WITH VOMITING, UNSPECIFIED: ICD-10-CM

## 2022-07-26 PROCEDURE — G6002: CPT | Mod: 26

## 2022-07-27 ENCOUNTER — NON-APPOINTMENT (OUTPATIENT)
Age: 72
End: 2022-07-27

## 2022-07-27 VITALS
OXYGEN SATURATION: 99 % | HEART RATE: 64 BPM | WEIGHT: 120.48 LBS | HEIGHT: 58 IN | RESPIRATION RATE: 17 BRPM | SYSTOLIC BLOOD PRESSURE: 138 MMHG | TEMPERATURE: 96.98 F | DIASTOLIC BLOOD PRESSURE: 78 MMHG | BODY MASS INDEX: 25.29 KG/M2

## 2022-07-27 PROCEDURE — 77014: CPT | Mod: 26

## 2022-07-27 NOTE — PHYSICAL EXAM
[General Appearance - Well Developed] : well developed [General Appearance - Alert] : alert [General Appearance - In No Acute Distress] : in no acute distress [Respiration, Rhythm And Depth] : normal respiratory rhythm and effort [Exaggerated Use Of Accessory Muscles For Inspiration] : no accessory muscle use [Abdomen Soft] : soft [Nondistended] : nondistended [Normal External Genitalia] : normal external genitalia  [Oriented To Time, Place, And Person] : oriented to person, place, and time [] : no rash [FreeTextEntry1] : some perirectal irritation  [de-identified] : no desquamation, faint erythema in groins

## 2022-07-27 NOTE — DISEASE MANAGEMENT
[Clinical] : TNM Stage: c [IB] : IB [FreeTextEntry4] : at least FIGO IB; pending MRI staging [TTNM] : 1b [NTNM] : 0 [MTNM] : 0 [de-identified] : 4400 cGy [de-identified] : 6400 cGy [de-identified] : Pelvis/Vagina

## 2022-07-27 NOTE — HISTORY OF PRESENT ILLNESS
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 \par PET/CT 5/10/22 showed SUV 13.0 avidity in vagina. No avid LAD or distant disease. \par MRI 6/12/22 shows a 3.6 x 3.3 x 2 cm lesion in the vagina only (T1b).\par \par Receiving concurrent chemo with Dr. Alexander (Mondays)\par \par 6/29/22: 3/32 fx: Notes brown vaginal discharge. Denies abdominal pain, dysuria or bowel complaints. Supportive meds & Sitz baths reviewed. Labs 6/27/22: HGB/HCT:11.9/36.8, WBC:4.75, Plts:138, ANC:2.64\par \par 7/6/22: 7/32 fx: Notes syncopal episode last Saturday. Felt possibly related to dehydration. Denies chest pain or SOB. Has brown vaginal discharge. Denies abdominal pain or dysuria. Labs 7/5/22: HGB/HCT:11.8/36.3, WBC 3.62:, Plts:115, ANC:2.58\par \par 7/13/22: 11/32 fx: Fatigue. Appetite good. Weight stable. No further syncopal episodes or vaginal bleeding/discharge. No bowel issues.\par Labs 7/11/22: HGB/HCT:11.2/33.4, WBC:2.78, Plts:104, ANC:2\par \par 7/20/2022:  Patient is seen for OTV today and is s/p 17/25 fx to vagina/pelvis.  She is eating well and has maintained her weight.  She denies any vaginal bleeding or discharge.  She had 5 watery BM on Sunday and Imodium helped and actually occurred after taking Miralax.  She denies any fevers/chills, syncopal episodes.   She continues on Cisplatin with Dr Blackwell.  She denies any urinary issues.   Labs from 7/18:  h/h 10.5/32.1, WBC 2.88, platelets 120, ANC 2.30\par \par 7/27/22: 22/25 fx\par Labs 7/25/22: HGB/HCT:10/28.8, WBC:2.76, Plts:95, ANC:2.01\par She feels discomfort around urethra/groin and has some skin dryness.  She is using Aquaphor after every time she urinates. She uses AZO 1x daily, it helps.  She is eating well and denies vaginal bleeding or discharge.  Her diarrhea is resolved and she continues Cisplatin with Dr Blackwell which she is tolerating well.

## 2022-07-27 NOTE — REVIEW OF SYSTEMS
[Constipation: Grade 0] : Constipation: Grade 0 [Rectal Pain: Grade 0] : Rectal Pain: Grade 0 [Fatigue: Grade 1 - Fatigue relieved by rest] : Fatigue: Grade 1 - Fatigue relieved by rest [Hematuria: Grade 0] : Hematuria: Grade 0 [Urinary Incontinence: Grade 0] : Urinary Incontinence: Grade 0  [Urinary Tract Pain: Grade 0] : Urinary Tract Pain: Grade 0 [Diarrhea: Grade 0] : Diarrhea: Grade 0 [Dermatitis Radiation: Grade 1 - Faint erythema or dry desquamation] : Dermatitis Radiation: Grade 1 - Faint erythema or dry desquamation

## 2022-07-28 PROCEDURE — G6002: CPT | Mod: 26

## 2022-07-29 ENCOUNTER — RESULT REVIEW (OUTPATIENT)
Age: 72
End: 2022-07-29

## 2022-07-29 ENCOUNTER — APPOINTMENT (OUTPATIENT)
Dept: HEMATOLOGY ONCOLOGY | Facility: CLINIC | Age: 72
End: 2022-07-29

## 2022-07-29 LAB
BASOPHILS # BLD AUTO: 0.01 K/UL — SIGNIFICANT CHANGE UP (ref 0–0.2)
BASOPHILS NFR BLD AUTO: 0.6 % — SIGNIFICANT CHANGE UP (ref 0–2)
BUN SERPL-MCNC: 6 MG/DL — LOW (ref 7–23)
CA-I BLDA-SCNC: 1.24 MMOL/L — SIGNIFICANT CHANGE UP (ref 1.12–1.3)
CHLORIDE SERPL-SCNC: 100 MMOL/L — SIGNIFICANT CHANGE UP (ref 96–108)
CO2 SERPL-SCNC: 25 MMOL/L — SIGNIFICANT CHANGE UP (ref 22–31)
CREAT SERPL-MCNC: 0.7 MG/DL — SIGNIFICANT CHANGE UP (ref 0.5–1.3)
EOSINOPHIL # BLD AUTO: 0.03 K/UL — SIGNIFICANT CHANGE UP (ref 0–0.5)
EOSINOPHIL NFR BLD AUTO: 1.8 % — SIGNIFICANT CHANGE UP (ref 0–6)
GLUCOSE SERPL-MCNC: 118 MG/DL — HIGH (ref 70–99)
HCT VFR BLD CALC: 28.1 % — LOW (ref 34.5–45)
HGB BLD-MCNC: 9.2 G/DL — LOW (ref 11.5–15.5)
IMM GRANULOCYTES NFR BLD AUTO: 0 % — SIGNIFICANT CHANGE UP (ref 0–1.5)
LYMPHOCYTES # BLD AUTO: 0.09 K/UL — LOW (ref 1–3.3)
LYMPHOCYTES # BLD AUTO: 5.4 % — LOW (ref 13–44)
MCHC RBC-ENTMCNC: 30.3 PG — SIGNIFICANT CHANGE UP (ref 27–34)
MCHC RBC-ENTMCNC: 32.7 G/DL — SIGNIFICANT CHANGE UP (ref 32–36)
MCV RBC AUTO: 92.4 FL — SIGNIFICANT CHANGE UP (ref 80–100)
MONOCYTES # BLD AUTO: 0.17 K/UL — SIGNIFICANT CHANGE UP (ref 0–0.9)
MONOCYTES NFR BLD AUTO: 10.1 % — SIGNIFICANT CHANGE UP (ref 2–14)
NEUTROPHILS # BLD AUTO: 1.38 K/UL — LOW (ref 1.8–7.4)
NEUTROPHILS NFR BLD AUTO: 82.1 % — HIGH (ref 43–77)
NRBC # BLD: 0 /100 WBCS — SIGNIFICANT CHANGE UP (ref 0–0)
PLATELET # BLD AUTO: 84 K/UL — LOW (ref 150–400)
POTASSIUM SERPL-MCNC: 4.3 MMOL/L — SIGNIFICANT CHANGE UP (ref 3.5–5.3)
POTASSIUM SERPL-SCNC: 4.3 MMOL/L — SIGNIFICANT CHANGE UP (ref 3.5–5.3)
RBC # BLD: 3.04 M/UL — LOW (ref 3.8–5.2)
RBC # FLD: 12.5 % — SIGNIFICANT CHANGE UP (ref 10.3–14.5)
SODIUM SERPL-SCNC: 136 MMOL/L — SIGNIFICANT CHANGE UP (ref 135–145)
WBC # BLD: 1.68 K/UL — LOW (ref 3.8–10.5)
WBC # FLD AUTO: 1.68 K/UL — LOW (ref 3.8–10.5)

## 2022-07-29 PROCEDURE — G6002: CPT | Mod: 26

## 2022-07-29 PROCEDURE — 77427 RADIATION TX MANAGEMENT X5: CPT

## 2022-08-01 ENCOUNTER — RESULT REVIEW (OUTPATIENT)
Age: 72
End: 2022-08-01

## 2022-08-01 ENCOUNTER — APPOINTMENT (OUTPATIENT)
Dept: INFUSION THERAPY | Facility: HOSPITAL | Age: 72
End: 2022-08-01

## 2022-08-01 LAB
BASOPHILS # BLD AUTO: 0.01 K/UL — SIGNIFICANT CHANGE UP (ref 0–0.2)
BASOPHILS NFR BLD AUTO: 0.5 % — SIGNIFICANT CHANGE UP (ref 0–2)
EOSINOPHIL # BLD AUTO: 0.03 K/UL — SIGNIFICANT CHANGE UP (ref 0–0.5)
EOSINOPHIL NFR BLD AUTO: 1.5 % — SIGNIFICANT CHANGE UP (ref 0–6)
HCT VFR BLD CALC: 28.7 % — LOW (ref 34.5–45)
HGB BLD-MCNC: 9.9 G/DL — LOW (ref 11.5–15.5)
IMM GRANULOCYTES NFR BLD AUTO: 1.5 % — SIGNIFICANT CHANGE UP (ref 0–1.5)
LYMPHOCYTES # BLD AUTO: 0.14 K/UL — LOW (ref 1–3.3)
LYMPHOCYTES # BLD AUTO: 6.8 % — LOW (ref 13–44)
MCHC RBC-ENTMCNC: 31.1 PG — SIGNIFICANT CHANGE UP (ref 27–34)
MCHC RBC-ENTMCNC: 34.5 G/DL — SIGNIFICANT CHANGE UP (ref 32–36)
MCV RBC AUTO: 90.3 FL — SIGNIFICANT CHANGE UP (ref 80–100)
MONOCYTES # BLD AUTO: 0.26 K/UL — SIGNIFICANT CHANGE UP (ref 0–0.9)
MONOCYTES NFR BLD AUTO: 12.6 % — SIGNIFICANT CHANGE UP (ref 2–14)
NEUTROPHILS # BLD AUTO: 1.59 K/UL — LOW (ref 1.8–7.4)
NEUTROPHILS NFR BLD AUTO: 77.1 % — HIGH (ref 43–77)
NRBC # BLD: 0 /100 WBCS — SIGNIFICANT CHANGE UP (ref 0–0)
PLATELET # BLD AUTO: 72 K/UL — LOW (ref 150–400)
RBC # BLD: 3.18 M/UL — LOW (ref 3.8–5.2)
RBC # FLD: 12.5 % — SIGNIFICANT CHANGE UP (ref 10.3–14.5)
WBC # BLD: 2.06 K/UL — LOW (ref 3.8–10.5)
WBC # FLD AUTO: 2.06 K/UL — LOW (ref 3.8–10.5)

## 2022-08-01 PROCEDURE — G6002: CPT | Mod: 26

## 2022-08-03 ENCOUNTER — RESULT REVIEW (OUTPATIENT)
Age: 72
End: 2022-08-03

## 2022-08-03 ENCOUNTER — APPOINTMENT (OUTPATIENT)
Dept: HEMATOLOGY ONCOLOGY | Facility: CLINIC | Age: 72
End: 2022-08-03

## 2022-08-03 ENCOUNTER — NON-APPOINTMENT (OUTPATIENT)
Age: 72
End: 2022-08-03

## 2022-08-03 VITALS
TEMPERATURE: 98.8 F | HEART RATE: 79 BPM | DIASTOLIC BLOOD PRESSURE: 66 MMHG | WEIGHT: 116.84 LBS | BODY MASS INDEX: 24.53 KG/M2 | HEIGHT: 58 IN | RESPIRATION RATE: 16 BRPM | SYSTOLIC BLOOD PRESSURE: 120 MMHG | OXYGEN SATURATION: 96 %

## 2022-08-03 LAB
BASOPHILS # BLD AUTO: 0 K/UL — SIGNIFICANT CHANGE UP (ref 0–0.2)
BASOPHILS NFR BLD AUTO: 0 % — SIGNIFICANT CHANGE UP (ref 0–2)
EOSINOPHIL # BLD AUTO: 0.05 K/UL — SIGNIFICANT CHANGE UP (ref 0–0.5)
EOSINOPHIL NFR BLD AUTO: 3 % — SIGNIFICANT CHANGE UP (ref 0–6)
HCT VFR BLD CALC: 26.5 % — LOW (ref 34.5–45)
HGB BLD-MCNC: 8.8 G/DL — LOW (ref 11.5–15.5)
LYMPHOCYTES # BLD AUTO: 0.1 K/UL — LOW (ref 1–3.3)
LYMPHOCYTES # BLD AUTO: 6 % — LOW (ref 13–44)
MCHC RBC-ENTMCNC: 30.6 PG — SIGNIFICANT CHANGE UP (ref 27–34)
MCHC RBC-ENTMCNC: 33.2 G/DL — SIGNIFICANT CHANGE UP (ref 32–36)
MCV RBC AUTO: 92 FL — SIGNIFICANT CHANGE UP (ref 80–100)
MONOCYTES # BLD AUTO: 0.16 K/UL — SIGNIFICANT CHANGE UP (ref 0–0.9)
MONOCYTES NFR BLD AUTO: 9 % — SIGNIFICANT CHANGE UP (ref 2–14)
NEUTROPHILS # BLD AUTO: 1.42 K/UL — LOW (ref 1.8–7.4)
NEUTROPHILS NFR BLD AUTO: 82 % — HIGH (ref 43–77)
NRBC # BLD: 0 /100 — SIGNIFICANT CHANGE UP (ref 0–0)
NRBC # BLD: SIGNIFICANT CHANGE UP /100 WBCS (ref 0–0)
PLAT MORPH BLD: NORMAL — SIGNIFICANT CHANGE UP
PLATELET # BLD AUTO: 62 K/UL — LOW (ref 150–400)
RBC # BLD: 2.88 M/UL — LOW (ref 3.8–5.2)
RBC # FLD: 12.9 % — SIGNIFICANT CHANGE UP (ref 10.3–14.5)
RBC BLD AUTO: SIGNIFICANT CHANGE UP
WBC # BLD: 1.73 K/UL — LOW (ref 3.8–10.5)
WBC # FLD AUTO: 1.73 K/UL — LOW (ref 3.8–10.5)

## 2022-08-03 PROCEDURE — 99214 OFFICE O/P EST MOD 30 MIN: CPT

## 2022-08-03 NOTE — PHYSICAL EXAM
[General Appearance - Well Developed] : well developed [General Appearance - Alert] : alert [General Appearance - In No Acute Distress] : in no acute distress [Respiration, Rhythm And Depth] : normal respiratory rhythm and effort [Exaggerated Use Of Accessory Muscles For Inspiration] : no accessory muscle use [Abdomen Soft] : soft [Nondistended] : nondistended [Normal External Genitalia] : normal external genitalia  [] : no rash [Oriented To Time, Place, And Person] : oriented to person, place, and time [FreeTextEntry1] : some perirectal irritation  [de-identified] : no desquamation, faint erythema in groins

## 2022-08-03 NOTE — REVIEW OF SYSTEMS
[Constipation: Grade 0] : Constipation: Grade 0 [Diarrhea: Grade 0] : Diarrhea: Grade 0 [Rectal Pain: Grade 0] : Rectal Pain: Grade 0 [Fatigue: Grade 1 - Fatigue relieved by rest] : Fatigue: Grade 1 - Fatigue relieved by rest [Hematuria: Grade 0] : Hematuria: Grade 0 [Urinary Incontinence: Grade 0] : Urinary Incontinence: Grade 0  [Urinary Tract Pain: Grade 0] : Urinary Tract Pain: Grade 0 [Dermatitis Radiation: Grade 1 - Faint erythema or dry desquamation] : Dermatitis Radiation: Grade 1 - Faint erythema or dry desquamation

## 2022-08-03 NOTE — DISEASE MANAGEMENT
[Clinical] : TNM Stage: c [FreeTextEntry4] : at least FIGO IB; pending MRI staging [TTNM] : 1b [NTNM] : 0 [MTNM] : 0 [IB] : IB [de-identified] : 7170cGy [de-identified] : 6400 cGy [de-identified] : Pelvis/Vagina

## 2022-08-03 NOTE — HISTORY OF PRESENT ILLNESS
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 \par PET/CT 5/10/22 showed SUV 13.0 avidity in vagina. No avid LAD or distant disease. \par MRI 6/12/22 shows a 3.6 x 3.3 x 2 cm lesion in the vagina only (T1b).\par \par Receiving concurrent chemo with Dr. Alexander (Mondays)\par \par 6/29/22: 3/32 fx: Notes brown vaginal discharge. Denies abdominal pain, dysuria or bowel complaints. Supportive meds & Sitz baths reviewed. Labs 6/27/22: HGB/HCT:11.9/36.8, WBC:4.75, Plts:138, ANC:2.64\par \par 7/6/22: 7/32 fx: Notes syncopal episode last Saturday. Felt possibly related to dehydration. Denies chest pain or SOB. Has brown vaginal discharge. Denies abdominal pain or dysuria. Labs 7/5/22: HGB/HCT:11.8/36.3, WBC 3.62:, Plts:115, ANC:2.58\par \par 7/13/22: 11/32 fx: Fatigue. Appetite good. Weight stable. No further syncopal episodes or vaginal bleeding/discharge. No bowel issues.\par Labs 7/11/22: HGB/HCT:11.2/33.4, WBC:2.78, Plts:104, ANC:2\par \par 7/20/2022:  Patient is seen for OTV today and is s/p 17/25 fx to vagina/pelvis.  She is eating well and has maintained her weight.  She denies any vaginal bleeding or discharge.  She had 5 watery BM on Sunday and Imodium helped and actually occurred after taking Miralax.  She denies any fevers/chills, syncopal episodes.   She continues on Cisplatin with Dr Blackwell.  She denies any urinary issues.   Labs from 7/18:  h/h 10.5/32.1, WBC 2.88, platelets 120, ANC 2.30\par \par 7/27/22: 22/25 fx\par Labs 7/25/22: HGB/HCT:10/28.8, WBC:2.76, Plts:95, ANC:2.01\par She feels discomfort around urethra/groin and has some skin dryness.  She is using Aquaphor after every time she urinates. She uses AZO 1x daily, it helps.  She is eating well and denies vaginal bleeding or discharge.  Her diarrhea is resolved and she continues Cisplatin with Dr Blackwell which she is tolerating well.  \par \par 8/3/2022 26/32 Fx\par Labs 8/1/2022 Hg/Hct 9.9/28.7, Plt 72, WBC 2.06, ANC 1.59\par Continues on chemotherapy with Cisplatin

## 2022-08-04 ENCOUNTER — APPOINTMENT (OUTPATIENT)
Dept: MRI IMAGING | Facility: CLINIC | Age: 72
End: 2022-08-04

## 2022-08-04 ENCOUNTER — OUTPATIENT (OUTPATIENT)
Dept: OUTPATIENT SERVICES | Facility: HOSPITAL | Age: 72
LOS: 1 days | End: 2022-08-04
Payer: MEDICARE

## 2022-08-04 DIAGNOSIS — C52 MALIGNANT NEOPLASM OF VAGINA: ICD-10-CM

## 2022-08-04 DIAGNOSIS — Z90.710 ACQUIRED ABSENCE OF BOTH CERVIX AND UTERUS: Chronic | ICD-10-CM

## 2022-08-04 PROCEDURE — A9585: CPT

## 2022-08-04 PROCEDURE — 72197 MRI PELVIS W/O & W/DYE: CPT | Mod: 26

## 2022-08-04 PROCEDURE — 72197 MRI PELVIS W/O & W/DYE: CPT

## 2022-08-05 LAB
ALBUMIN SERPL ELPH-MCNC: 3.9 G/DL
ALP BLD-CCNC: 74 U/L
ALT SERPL-CCNC: 32 U/L
ANION GAP SERPL CALC-SCNC: 12 MMOL/L
AST SERPL-CCNC: 24 U/L
BILIRUB SERPL-MCNC: 0.3 MG/DL
BUN SERPL-MCNC: 8 MG/DL
CALCIUM SERPL-MCNC: 9 MG/DL
CHLORIDE SERPL-SCNC: 98 MMOL/L
CO2 SERPL-SCNC: 22 MMOL/L
CREAT SERPL-MCNC: 0.74 MG/DL
EGFR: 88 ML/MIN/1.73M2
GLUCOSE SERPL-MCNC: 97 MG/DL
POTASSIUM SERPL-SCNC: 4.2 MMOL/L
PROT SERPL-MCNC: 6.2 G/DL
SODIUM SERPL-SCNC: 133 MMOL/L

## 2022-08-05 NOTE — HISTORY OF PRESENT ILLNESS
[Disease: _____________________] : Disease: [unfilled] [AJCC Stage: ____] : AJCC Stage: [unfilled] [de-identified] : Latasha is a 71 year old woman referred by Dr. Loredo for newly diagnosed vaginal cancer.\par In February 2022, she had spotting. She went for annual check up. She had colonoscopy and mammogram. Spotting continued, and she saw Gyn (Dr. More). He did biopsy and referred her Dr. Loredo.\par She had another biopsy and PET was obtained.\par \par 4/19/22 CT Abdomen/Pelvis \par Heterogeneously enhancing mass in the vagina measuring 5.3 x 2.2 x 3.6 cm. 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 No pain. No bowel or bladder issues.\par Appetite is normal. No change in weight.\par \par No significant past medical history.\par No known family history.\par \par Last gyn exam was in 1997. She had hysterectomy in 1997 for fibroids.\par , no children.  She has 9 siblings.\par Ex- smoker and drinking many years ago. \par She works from home. \par  [de-identified] : Patient with pain at the site of RT. She is on steroid ointment/ supp with some relief. She is going for MRI pelvis to assess response. If good clinical response, plan is to start intracavitary RT.\par Denies any bleeding.

## 2022-08-08 ENCOUNTER — APPOINTMENT (OUTPATIENT)
Dept: HEMATOLOGY ONCOLOGY | Facility: CLINIC | Age: 72
End: 2022-08-08

## 2022-08-08 ENCOUNTER — RESULT REVIEW (OUTPATIENT)
Age: 72
End: 2022-08-08

## 2022-08-08 ENCOUNTER — APPOINTMENT (OUTPATIENT)
Dept: INFUSION THERAPY | Facility: HOSPITAL | Age: 72
End: 2022-08-08

## 2022-08-08 LAB
BASOPHILS # BLD AUTO: 0 K/UL — SIGNIFICANT CHANGE UP (ref 0–0.2)
BASOPHILS NFR BLD AUTO: 0 % — SIGNIFICANT CHANGE UP (ref 0–2)
EOSINOPHIL # BLD AUTO: 0.02 K/UL — SIGNIFICANT CHANGE UP (ref 0–0.5)
EOSINOPHIL NFR BLD AUTO: 1 % — SIGNIFICANT CHANGE UP (ref 0–6)
HCT VFR BLD CALC: 26.2 % — LOW (ref 34.5–45)
HGB BLD-MCNC: 9 G/DL — LOW (ref 11.5–15.5)
LYMPHOCYTES # BLD AUTO: 0.58 K/UL — LOW (ref 1–3.3)
LYMPHOCYTES # BLD AUTO: 35 % — SIGNIFICANT CHANGE UP (ref 13–44)
MCHC RBC-ENTMCNC: 31 PG — SIGNIFICANT CHANGE UP (ref 27–34)
MCHC RBC-ENTMCNC: 34.4 G/DL — SIGNIFICANT CHANGE UP (ref 32–36)
MCV RBC AUTO: 90.3 FL — SIGNIFICANT CHANGE UP (ref 80–100)
MONOCYTES # BLD AUTO: 0.18 K/UL — SIGNIFICANT CHANGE UP (ref 0–0.9)
MONOCYTES NFR BLD AUTO: 11 % — SIGNIFICANT CHANGE UP (ref 2–14)
NEUTROPHILS # BLD AUTO: 0.87 K/UL — LOW (ref 1.8–7.4)
NEUTROPHILS NFR BLD AUTO: 53 % — SIGNIFICANT CHANGE UP (ref 43–77)
NRBC # BLD: 0 /100 — SIGNIFICANT CHANGE UP (ref 0–0)
NRBC # BLD: SIGNIFICANT CHANGE UP /100 WBCS (ref 0–0)
PLAT MORPH BLD: NORMAL — SIGNIFICANT CHANGE UP
PLATELET # BLD AUTO: 66 K/UL — LOW (ref 150–400)
RBC # BLD: 2.9 M/UL — LOW (ref 3.8–5.2)
RBC # FLD: 13.5 % — SIGNIFICANT CHANGE UP (ref 10.3–14.5)
RBC BLD AUTO: SIGNIFICANT CHANGE UP
WBC # BLD: 1.65 K/UL — LOW (ref 3.8–10.5)
WBC # FLD AUTO: 1.65 K/UL — LOW (ref 3.8–10.5)

## 2022-08-10 ENCOUNTER — NON-APPOINTMENT (OUTPATIENT)
Age: 72
End: 2022-08-10

## 2022-08-10 VITALS
RESPIRATION RATE: 16 BRPM | HEART RATE: 79 BPM | OXYGEN SATURATION: 98 % | BODY MASS INDEX: 24.24 KG/M2 | DIASTOLIC BLOOD PRESSURE: 71 MMHG | SYSTOLIC BLOOD PRESSURE: 136 MMHG | TEMPERATURE: 98.96 F | WEIGHT: 115.99 LBS

## 2022-08-12 PROCEDURE — 77290 THER RAD SIMULAJ FIELD CPLX: CPT | Mod: 26

## 2022-08-12 PROCEDURE — 77334 RADIATION TREATMENT AID(S): CPT | Mod: 26

## 2022-08-22 PROCEDURE — 77295 3-D RADIOTHERAPY PLAN: CPT | Mod: 26

## 2022-08-24 PROCEDURE — 57156 INS VAG BRACHYTX DEVICE: CPT

## 2022-08-24 PROCEDURE — 77332 RADIATION TREATMENT AID(S): CPT | Mod: 26

## 2022-08-24 PROCEDURE — 77770 HDR RDNCL NTRSTL/ICAV BRCHTX: CPT | Mod: 26

## 2022-08-26 PROCEDURE — 57156 INS VAG BRACHYTX DEVICE: CPT

## 2022-08-26 PROCEDURE — 77332 RADIATION TREATMENT AID(S): CPT | Mod: 26

## 2022-08-26 PROCEDURE — 77770 HDR RDNCL NTRSTL/ICAV BRCHTX: CPT | Mod: 26

## 2022-09-02 PROCEDURE — 77332 RADIATION TREATMENT AID(S): CPT | Mod: 26

## 2022-09-02 PROCEDURE — 57156 INS VAG BRACHYTX DEVICE: CPT

## 2022-09-02 PROCEDURE — 77770 HDR RDNCL NTRSTL/ICAV BRCHTX: CPT | Mod: 26

## 2022-09-08 ENCOUNTER — NON-APPOINTMENT (OUTPATIENT)
Age: 72
End: 2022-09-08

## 2022-09-12 ENCOUNTER — APPOINTMENT (OUTPATIENT)
Dept: GYNECOLOGIC ONCOLOGY | Facility: CLINIC | Age: 72
End: 2022-09-12

## 2022-09-12 VITALS
SYSTOLIC BLOOD PRESSURE: 136 MMHG | TEMPERATURE: 98 F | OXYGEN SATURATION: 100 % | HEIGHT: 58 IN | WEIGHT: 113 LBS | DIASTOLIC BLOOD PRESSURE: 68 MMHG | HEART RATE: 62 BPM | BODY MASS INDEX: 23.72 KG/M2

## 2022-09-12 PROCEDURE — 99214 OFFICE O/P EST MOD 30 MIN: CPT

## 2022-09-15 ENCOUNTER — NON-APPOINTMENT (OUTPATIENT)
Age: 72
End: 2022-09-15

## 2022-09-17 ENCOUNTER — NON-APPOINTMENT (OUTPATIENT)
Age: 72
End: 2022-09-17

## 2022-09-29 ENCOUNTER — OUTPATIENT (OUTPATIENT)
Dept: OUTPATIENT SERVICES | Facility: HOSPITAL | Age: 72
LOS: 1 days | Discharge: HOME | End: 2022-09-29

## 2022-09-29 DIAGNOSIS — C52 MALIGNANT NEOPLASM OF VAGINA: ICD-10-CM

## 2022-09-29 DIAGNOSIS — Z90.710 ACQUIRED ABSENCE OF BOTH CERVIX AND UTERUS: Chronic | ICD-10-CM

## 2022-10-04 ENCOUNTER — RESULT REVIEW (OUTPATIENT)
Age: 72
End: 2022-10-04

## 2022-10-04 ENCOUNTER — APPOINTMENT (OUTPATIENT)
Dept: HEMATOLOGY ONCOLOGY | Facility: CLINIC | Age: 72
End: 2022-10-04

## 2022-10-04 VITALS
RESPIRATION RATE: 16 BRPM | OXYGEN SATURATION: 96 % | TEMPERATURE: 97.2 F | DIASTOLIC BLOOD PRESSURE: 68 MMHG | BODY MASS INDEX: 24.11 KG/M2 | HEIGHT: 57.99 IN | HEART RATE: 65 BPM | WEIGHT: 114.86 LBS | SYSTOLIC BLOOD PRESSURE: 150 MMHG

## 2022-10-04 LAB
BASOPHILS # BLD AUTO: 0.04 K/UL — SIGNIFICANT CHANGE UP (ref 0–0.2)
BASOPHILS NFR BLD AUTO: 1.1 % — SIGNIFICANT CHANGE UP (ref 0–2)
EOSINOPHIL # BLD AUTO: 0.18 K/UL — SIGNIFICANT CHANGE UP (ref 0–0.5)
EOSINOPHIL NFR BLD AUTO: 4.9 % — SIGNIFICANT CHANGE UP (ref 0–6)
HCT VFR BLD CALC: 33.5 % — LOW (ref 34.5–45)
HGB BLD-MCNC: 10.8 G/DL — LOW (ref 11.5–15.5)
IMM GRANULOCYTES NFR BLD AUTO: 0.3 % — SIGNIFICANT CHANGE UP (ref 0–0.9)
LYMPHOCYTES # BLD AUTO: 1 K/UL — SIGNIFICANT CHANGE UP (ref 1–3.3)
LYMPHOCYTES # BLD AUTO: 27.2 % — SIGNIFICANT CHANGE UP (ref 13–44)
MCHC RBC-ENTMCNC: 32.2 G/DL — SIGNIFICANT CHANGE UP (ref 32–36)
MCHC RBC-ENTMCNC: 33.1 PG — SIGNIFICANT CHANGE UP (ref 27–34)
MCV RBC AUTO: 102.5 FL — HIGH (ref 80–100)
MONOCYTES # BLD AUTO: 0.34 K/UL — SIGNIFICANT CHANGE UP (ref 0–0.9)
MONOCYTES NFR BLD AUTO: 9.3 % — SIGNIFICANT CHANGE UP (ref 2–14)
NEUTROPHILS # BLD AUTO: 2.1 K/UL — SIGNIFICANT CHANGE UP (ref 1.8–7.4)
NEUTROPHILS NFR BLD AUTO: 57.2 % — SIGNIFICANT CHANGE UP (ref 43–77)
NRBC # BLD: 0 /100 WBCS — SIGNIFICANT CHANGE UP (ref 0–0)
PLATELET # BLD AUTO: 125 K/UL — LOW (ref 150–400)
RBC # BLD: 3.26 M/UL — LOW (ref 3.8–5.2)
RBC # FLD: 13.6 % — SIGNIFICANT CHANGE UP (ref 10.3–14.5)
WBC # BLD: 3.67 K/UL — LOW (ref 3.8–10.5)
WBC # FLD AUTO: 3.67 K/UL — LOW (ref 3.8–10.5)

## 2022-10-04 PROCEDURE — 99213 OFFICE O/P EST LOW 20 MIN: CPT

## 2022-10-04 NOTE — PHYSICAL EXAM
AVSS. No complaints of pain. Initiated IVIG a little after 1930, tolerating well. Some SBPs in mid 80s while pt has been sleeping and sleeping on belly. IVIG to end around 0040. Eating and drinking well. Cousin/aunt is at bedside for overnight.    [Restricted in physically strenuous activity but ambulatory and able to carry out work of a light or sedentary nature] : Status 1- Restricted in physically strenuous activity but ambulatory and able to carry out work of a light or sedentary nature, e.g., light house work, office work [Normal] : affect appropriate

## 2022-10-05 LAB
ALBUMIN SERPL ELPH-MCNC: 4.7 G/DL
ALP BLD-CCNC: 49 U/L
ALT SERPL-CCNC: 23 U/L
ANION GAP SERPL CALC-SCNC: 12 MMOL/L
AST SERPL-CCNC: 27 U/L
BILIRUB SERPL-MCNC: 0.4 MG/DL
BUN SERPL-MCNC: 18 MG/DL
CALCIUM SERPL-MCNC: 9.7 MG/DL
CHLORIDE SERPL-SCNC: 106 MMOL/L
CO2 SERPL-SCNC: 23 MMOL/L
CREAT SERPL-MCNC: 0.84 MG/DL
EGFR: 75 ML/MIN/1.73M2
GLUCOSE SERPL-MCNC: 106 MG/DL
MAGNESIUM SERPL-MCNC: 2.2 MG/DL
POTASSIUM SERPL-SCNC: 3.7 MMOL/L
PROT SERPL-MCNC: 7.3 G/DL
SODIUM SERPL-SCNC: 140 MMOL/L

## 2022-10-05 NOTE — HISTORY OF PRESENT ILLNESS
[Disease: _____________________] : Disease: [unfilled] [AJCC Stage: ____] : AJCC Stage: [unfilled] [de-identified] : Latasha is a 71 year old woman referred by Dr. Loredo for newly diagnosed vaginal cancer.\par In February 2022, she had spotting. She went for annual check up. She had colonoscopy and mammogram. Spotting continued, and she saw Gyn (Dr. More). He did biopsy and referred her Dr. Loredo.\par She had another biopsy and PET was obtained.\par \par 4/19/22 CT Abdomen/Pelvis \par Heterogeneously enhancing mass in the vagina measuring 5.3 x 2.2 x 3.6 cm. 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 No pain. No bowel or bladder issues.\par Appetite is normal. No change in weight.\par \par No significant past medical history.\par No known family history.\par \par Last gyn exam was in 1997. She had hysterectomy in 1997 for fibroids.\par , no children.  She has 9 siblings.\par Ex- smoker and drinking many years ago. \par She works from home. \par  [de-identified] : Patient is here for follow up.\par She completed RT in August.\par She complains of vaginal dryness, has some discomfort with urinating.\par No diarrhea, occasional pelvic cramping.

## 2022-10-13 ENCOUNTER — APPOINTMENT (OUTPATIENT)
Dept: RADIATION ONCOLOGY | Facility: CLINIC | Age: 72
End: 2022-10-13

## 2022-10-13 PROCEDURE — 99024 POSTOP FOLLOW-UP VISIT: CPT

## 2022-10-13 RX ORDER — SILVER SULFADIAZINE 10 MG/G
1 CREAM TOPICAL TWICE DAILY
Qty: 1 | Refills: 2 | Status: DISCONTINUED | COMMUNITY
Start: 2022-08-02 | End: 2022-10-13

## 2022-10-13 RX ORDER — OXYCODONE HYDROCHLORIDE 5 MG/1
5 CAPSULE ORAL EVERY 6 HOURS
Qty: 20 | Refills: 0 | Status: DISCONTINUED | COMMUNITY
Start: 2022-08-10 | End: 2022-10-13

## 2022-10-13 RX ORDER — MULTIVIT-MIN/FOLIC/VIT K/LYCOP 400-300MCG
250 TABLET ORAL DAILY
Refills: 0 | Status: ACTIVE | COMMUNITY
Start: 2022-10-13

## 2022-10-13 RX ORDER — PROCHLORPERAZINE MALEATE 10 MG/1
10 TABLET ORAL EVERY 6 HOURS
Qty: 20 | Refills: 0 | Status: DISCONTINUED | COMMUNITY
Start: 2022-06-16 | End: 2022-10-13

## 2022-10-13 RX ORDER — TRAMADOL HYDROCHLORIDE 50 MG/1
50 TABLET, COATED ORAL 4 TIMES DAILY
Qty: 60 | Refills: 0 | Status: DISCONTINUED | COMMUNITY
Start: 2022-08-02 | End: 2022-10-13

## 2022-10-13 RX ORDER — HYDROCORTISONE 25 MG/G
2.5 CREAM TOPICAL TWICE DAILY
Qty: 1 | Refills: 0 | Status: DISCONTINUED | COMMUNITY
Start: 2022-08-02 | End: 2022-10-13

## 2022-10-13 NOTE — HISTORY OF PRESENT ILLNESS
[Home] : at home, [unfilled] , at the time of the visit. [Medical Office: ([facility])___] : at the medical office located in  [Verbal consent obtained from patient] : the patient, [unfilled] [FreeTextEntry1] : 71-year-old female with T1b vaginal cancer (SCC), at least 4 cm lesion at introitus to mid vagina on right wall to rectovaginal septum. PET/CT 5/10/22 showed SUV 13.0 avidity in vagina. No avid LAD or distant disease. MRI 6/12/22 showed a 3.6 x 3.3 x 2 cm lesion in the vagina only. External beam chemoradiation to the pelvis recommended and completed on 8/1/22, with excellent \par response; interval MRI on 8/4/22 showed complete resolution of the vaginal primary tumor. Radiation treatment was completed with vaginal cuff brachytherapy boost to the posterior vaginal wall. Her treatment \par course is summarized as follows: \par \par Radiation Treatment Summary: \par Pelvis/Vagina  5,000 cGy from 6/27/2022 - 8/01/2022  25 fx \par Vaginal Boost  2,100 cGy from  8/24/2022 - 9/02/2022 3 fx\par \par 8/4/22: MRI pelvis: No residual vaginal mass can be identified status post therapy\par \par Today: Feels generally well and offers no acute complaints. Denies abdominal pain, vaginal discharge/bleeding, dysuria incontinence or bowel complaints. Of note, she followed up with Dr. Loredo September and had a negative gyne exam. Dilators ordered by Dr. Loredo's office but has not received to date. She also saw Dr. Blackwell with plan for PET/CT. \par \par \par  \par

## 2022-10-13 NOTE — PHYSICAL EXAM
[General Appearance - Well Developed] : well developed [General Appearance - In No Acute Distress] : in no acute distress [Respiration, Rhythm And Depth] : normal respiratory rhythm and effort [Exaggerated Use Of Accessory Muscles For Inspiration] : no accessory muscle use [Abdomen Soft] : soft [Nondistended] : nondistended [Normal External Genitalia] : normal external genitalia  [] : no rash [FreeTextEntry1] : some perirectal irritation  [de-identified] : no desquamation, faint erythema in groins [General Appearance - Alert] : alert [Oriented To Time, Place, And Person] : oriented to person, place, and time

## 2022-12-12 ENCOUNTER — APPOINTMENT (OUTPATIENT)
Dept: GYNECOLOGIC ONCOLOGY | Facility: CLINIC | Age: 72
End: 2022-12-12

## 2022-12-12 VITALS
HEIGHT: 57 IN | HEART RATE: 64 BPM | DIASTOLIC BLOOD PRESSURE: 67 MMHG | SYSTOLIC BLOOD PRESSURE: 150 MMHG | TEMPERATURE: 98.2 F | WEIGHT: 114 LBS | BODY MASS INDEX: 24.59 KG/M2 | OXYGEN SATURATION: 100 %

## 2022-12-12 PROCEDURE — 99214 OFFICE O/P EST MOD 30 MIN: CPT

## 2022-12-12 NOTE — PHYSICAL EXAM
[Chaperone Present] : A chaperone was present in the examining room during all aspects of the physical examination [Absent] : Adnexa(ae): Absent [Normal] : Recto-Vaginal Exam: Normal [Fully active, able to carry on all pre-disease performance without restriction] : Status 0 - Fully active, able to carry on all pre-disease performance without restriction [de-identified] : +radiation changes, no new lesions, resolved prior mass [de-identified] : smooth rectovag septum, no cul de sac nodules

## 2022-12-12 NOTE — DISCUSSION/SUMMARY
[FreeTextEntry1] : Patient is clinically MELL, planning to travel to [facility]\par will obtain PET CT\par f/u 4 months \par all questions answered in detail.

## 2022-12-12 NOTE — HISTORY OF PRESENT ILLNESS
[FreeTextEntry1] : GYN/Ref: Dr. More\par PCP: Rebecca Swift MD\par \par Ms. Perez, 71 years old, referred by Dr. More for stage 3 vaginal cancer. Patient is now s/p chemo/RT , completed 9/2022\par \par Pathology:\par 4/25/22 vaginal mass biopsy: squamous cell carcinoma, well to moderately differentiated. \par 5/3/22 slide consultation report by Hilda from [facility] of 4/14/22: Squamous cell carcinoma\par \par Patient is now s/p Cis/RT - held one wk of Cis due to counts. Received EBRT and Vag Brachy. \par Patient is feelign well, using dilators 3x/wk, no new pain, or bleeding.\par \par Imaging:\par MR Pelvis .8/4/22\par -No residual vaginal mass can be identified status post therapy\par \par

## 2022-12-22 ENCOUNTER — APPOINTMENT (OUTPATIENT)
Dept: NUCLEAR MEDICINE | Facility: IMAGING CENTER | Age: 72
End: 2022-12-22

## 2022-12-22 ENCOUNTER — OUTPATIENT (OUTPATIENT)
Dept: OUTPATIENT SERVICES | Facility: HOSPITAL | Age: 72
LOS: 1 days | End: 2022-12-22
Payer: MEDICARE

## 2022-12-22 DIAGNOSIS — C52 MALIGNANT NEOPLASM OF VAGINA: ICD-10-CM

## 2022-12-22 DIAGNOSIS — Z90.710 ACQUIRED ABSENCE OF BOTH CERVIX AND UTERUS: Chronic | ICD-10-CM

## 2022-12-22 PROCEDURE — 78815 PET IMAGE W/CT SKULL-THIGH: CPT | Mod: 26,PS

## 2022-12-22 PROCEDURE — 78815 PET IMAGE W/CT SKULL-THIGH: CPT

## 2022-12-22 PROCEDURE — A9552: CPT

## 2023-01-13 ENCOUNTER — APPOINTMENT (OUTPATIENT)
Dept: RADIATION ONCOLOGY | Facility: CLINIC | Age: 73
End: 2023-01-13
Payer: MEDICARE

## 2023-01-13 VITALS
DIASTOLIC BLOOD PRESSURE: 68 MMHG | OXYGEN SATURATION: 100 % | WEIGHT: 116.4 LBS | HEIGHT: 57 IN | TEMPERATURE: 97.7 F | BODY MASS INDEX: 25.11 KG/M2 | SYSTOLIC BLOOD PRESSURE: 144 MMHG | HEART RATE: 79 BPM | RESPIRATION RATE: 17 BRPM

## 2023-01-13 PROCEDURE — 99214 OFFICE O/P EST MOD 30 MIN: CPT

## 2023-01-13 RX ORDER — OMEGA-3/DHA/EPA/FISH OIL 1200 MG
1200 CAPSULE ORAL DAILY
Refills: 0 | Status: DISCONTINUED | COMMUNITY
Start: 2022-10-13 | End: 2023-01-13

## 2023-01-13 NOTE — HISTORY OF PRESENT ILLNESS
[FreeTextEntry1] : 72 year-old female with T1b vaginal cancer (SCC), at least 4 cm lesion at introitus to mid vagina on right wall to rectovaginal septum. PET/CT 5/10/22 showed SUV 13.0 avidity in vagina. No avid LAD or distant disease. MRI 6/12/22 showed a 3.6 x 3.3 x 2 cm lesion in the vagina only. External beam chemoradiation to the pelvis recommended and completed on 8/1/22, with excellent \par response; interval MRI on 8/4/22 showed complete resolution of the vaginal primary tumor. Radiation treatment was completed with vaginal cuff brachytherapy boost to the posterior vaginal wall. Her treatment course is summarized as follows: \par \par Radiation Treatment Summary: \par Pelvis/Vagina  5,000 cGy from 6/27/2022 - 8/01/2022  25 fx \par Vaginal Boost  2,100 cGy from  8/24/2022 - 9/02/2022 3 fx\par \par 8/4/22: MRI pelvis: No residual vaginal mass can be identified status post therapy\par \par 10/13/22 Telephonic PTE: Feels generally well and offers no acute complaints. Denies abdominal pain, vaginal discharge/bleeding, dysuria incontinence or bowel complaints. \par \par followed up with Dr. Loredo 9/12/22 and had a negative gyne exam. She also saw Dr. Blackwell with plan for PET/CT. Repeat exam by Dr. Loredo 12/12/22 also benign, although very uncomfortable per patient. \par \par 12/22/22: PET/CT: Small FDG-avid focus in the vagina, significantly decreased in size and metabolism as compared to prior study, is indeterminate. This may represent a focus of residual disease versus posttreatment inflammation.  New postradiation therapy changes, skin and subcutaneous fat/muscles of lower anterior abdominal wall and pelvis. No scan evidence of metastatic disease\par \par Today 1/13/23: Feels generally well but notes intermittent groin discomfort, numbness and labial pruritus.  Denies abdominal pain, vaginal discharge/bleeding, dysuria incontinence. Taking Miralax for constipation. Using Vaginal dilator size #1 3x week; initially very uncomfortable but has improved.

## 2023-01-13 NOTE — PHYSICAL EXAM
[General Appearance - Alert] : alert [Oriented To Time, Place, And Person] : oriented to person, place, and time [General Appearance - In No Acute Distress] : in no acute distress [Thin] : thin [Sclera] : the sclera and conjunctiva were normal [Extraocular Movements] : extraocular movements were intact [Outer Ear] : the ears and nose were normal in appearance [] : no respiratory distress [Respiration, Rhythm And Depth] : normal respiratory rhythm and effort [Exaggerated Use Of Accessory Muscles For Inspiration] : no accessory muscle use [Abdomen Soft] : soft [Nondistended] : nondistended [Range of Motion to Joints] : range of motion to joints [Nail Clubbing] : no clubbing  or cyanosis of the fingernails [Skin Color & Pigmentation] : normal skin color and pigmentation [Affect] : the affect was normal [Hearing Threshold Finger Rub Not Dent] : hearing was normal [Arterial Pulses Normal] : the arterial pulses were normal [Edema] : no peripheral edema present [Inguinal Lymph Nodes Enlarged Bilaterally] : inguinal [Musculoskeletal - Swelling] : no joint swelling [No Focal Deficits] : no focal deficits [Not Anxious] : not anxious [de-identified] : Digital vaginal exam: distal stenosis and sensitivity, no lesions, discharge or bleeding; vulvar dry skin

## 2023-01-13 NOTE — REVIEW OF SYSTEMS
[Rectal Pain: Grade 0] : Rectal Pain: Grade 0 [Fatigue: Grade 1 - Fatigue relieved by rest] : Fatigue: Grade 1 - Fatigue relieved by rest [Hematuria: Grade 0] : Hematuria: Grade 0 [Urinary Incontinence: Grade 0] : Urinary Incontinence: Grade 0  [Urinary Tract Pain: Grade 0] : Urinary Tract Pain: Grade 0 [Constipation: Grade 1 - Occasional or intermittent symptoms; occasional use of stool softeners, laxatives, dietary modification, or enema] : Constipation: Grade 1 - Occasional or intermittent symptoms; occasional use of stool softeners, laxatives, dietary modification, or enema [Pruritus: Grade 1 - Mild or localized; topical intervention indicated] : Pruritus: Grade 1 - Mild or localized; topical intervention indicated [Skin Hyperpigmentation: Grade 1 - Hyperpigmentation covering <10% BSA; no psychosocial impact] : Skin Hyperpigmentation: Grade 1 - Hyperpigmentation covering <10% BSA; no psychosocial impact

## 2023-04-17 ENCOUNTER — APPOINTMENT (OUTPATIENT)
Dept: GYNECOLOGIC ONCOLOGY | Facility: CLINIC | Age: 73
End: 2023-04-17

## 2023-04-25 ENCOUNTER — APPOINTMENT (OUTPATIENT)
Dept: RADIATION ONCOLOGY | Facility: CLINIC | Age: 73
End: 2023-04-25
Payer: MEDICARE

## 2023-04-25 VITALS
OXYGEN SATURATION: 100 % | SYSTOLIC BLOOD PRESSURE: 158 MMHG | HEART RATE: 69 BPM | RESPIRATION RATE: 17 BRPM | TEMPERATURE: 96.98 F | HEIGHT: 57 IN | DIASTOLIC BLOOD PRESSURE: 77 MMHG

## 2023-04-25 DIAGNOSIS — L08.9 LOCAL INFECTION OF THE SKIN AND SUBCUTANEOUS TISSUE, UNSPECIFIED: ICD-10-CM

## 2023-04-25 PROCEDURE — 99213 OFFICE O/P EST LOW 20 MIN: CPT

## 2023-04-25 NOTE — HISTORY OF PRESENT ILLNESS
[FreeTextEntry1] : 72 year-old female with T1b vaginal cancer (SCC), at least 4 cm lesion at introitus to mid vagina on right wall to rectovaginal septum. PET/CT 5/10/22 showed SUV 13.0 avidity in vagina. No avid LAD or distant disease. MRI 6/12/22 showed a 3.6 x 3.3 x 2 cm lesion in the vagina only. External beam chemoradiation to the pelvis recommended and completed on 8/1/22, with excellent \par response; interval MRI on 8/4/22 showed complete resolution of the vaginal primary tumor. Radiation treatment was completed with vaginal cuff brachytherapy boost to the posterior vaginal wall. Her treatment course is summarized as follows: \par \par Radiation Treatment Summary: \par Pelvis/Vagina  5,000 cGy from 6/27/2022 - 8/01/2022  25 fx \par Vaginal Boost  2,100 cGy from  8/24/2022 - 9/02/2022 3 fx\par \par 8/4/22: MRI pelvis: No residual vaginal mass can be identified status post therapy\par \par 10/13/22 Telephonic PTE: Feels generally well and offers no acute complaints. Denies abdominal pain, vaginal discharge/bleeding, dysuria incontinence or bowel complaints. \par \par Followed up with Dr. Loredo 9/12/22 and had a negative gyne exam. She also saw Dr. Blackwell with plan for PET/CT. Repeat exam by Dr. Loredo 12/12/22 also benign, although very uncomfortable per patient. \par \par 12/22/22: PET/CT: Small FDG-avid focus in the vagina, significantly decreased in size and metabolism as compared to prior study, is indeterminate. This may represent a focus of residual disease versus posttreatment inflammation.  New postradiation therapy changes, skin and subcutaneous fat/muscles of lower anterior abdominal wall and pelvis. No scan evidence of metastatic disease\par \par 1/13/23: follow up\par Feels generally well but notes intermittent groin discomfort, numbness and labial pruritus.  Denies abdominal pain, vaginal discharge/bleeding, dysuria incontinence. Taking Miralax for constipation. Using Vaginal dilator size #1 3x week; initially very uncomfortable but has improved. \par \par 4/25/23: follow up for new onset skin issues\par Notes right groin & vulvar tenderness to palpation & erythema; thickened skin with intermittent itching. Some improvement with moisturizer. Denies abdominal pain, vaginal discharge/bleeding,  dysuria or bowel complaints. Using Vaginal dilator

## 2023-04-25 NOTE — REVIEW OF SYSTEMS
[Constipation: Grade 1 - Occasional or intermittent symptoms; occasional use of stool softeners, laxatives, dietary modification, or enema] : Constipation: Grade 1 - Occasional or intermittent symptoms; occasional use of stool softeners, laxatives, dietary modification, or enema [Rectal Pain: Grade 0] : Rectal Pain: Grade 0 [Fatigue: Grade 0] : Fatigue: Grade 0 [Hematuria: Grade 0] : Hematuria: Grade 0 [Urinary Incontinence: Grade 0] : Urinary Incontinence: Grade 0  [Urinary Tract Pain: Grade 0] : Urinary Tract Pain: Grade 0 [Skin Hyperpigmentation: Grade 1 - Hyperpigmentation covering <10% BSA; no psychosocial impact] : Skin Hyperpigmentation: Grade 1 - Hyperpigmentation covering <10% BSA; no psychosocial impact [Dermatitis Radiation: Grade 1 - Faint erythema or dry desquamation] : Dermatitis Radiation: Grade 1 - Faint erythema or dry desquamation [Genital Edema: Grade 1 - Mild swelling or obscuration of anatomic architecture on close inspection] : Genital Edema: Grade 1 - Mild swelling or obscuration of anatomic architecture on close inspection

## 2023-04-25 NOTE — VITALS
[Least Pain Intensity: 0/10] : 0/10 [80: Normal activity with effort; some signs or symptoms of disease.] : 80: Normal activity with effort; some signs or symptoms of disease.  [ECOG Performance Status: 1 - Restricted in physically strenuous activity but ambulatory and able to carry out work of a light or sedentary nature] : Performance Status: 1 - Restricted in physically strenuous activity but ambulatory and able to carry out work of a light or sedentary nature, e.g., light house work, office work [Maximal Pain Intensity: 2/10] : 2/10 [Pain Location: ___] : Pain Location: [unfilled] [OTC] : OTC

## 2023-04-25 NOTE — PHYSICAL EXAM
[General Appearance - Alert] : alert [General Appearance - In No Acute Distress] : in no acute distress [Thin] : thin [Sclera] : the sclera and conjunctiva were normal [Extraocular Movements] : extraocular movements were intact [Outer Ear] : the ears and nose were normal in appearance [Hearing Threshold Finger Rub Not Iron] : hearing was normal [] : no respiratory distress [Respiration, Rhythm And Depth] : normal respiratory rhythm and effort [Exaggerated Use Of Accessory Muscles For Inspiration] : no accessory muscle use [Arterial Pulses Normal] : the arterial pulses were normal [Edema] : no peripheral edema present [Abdomen Soft] : soft [Nondistended] : nondistended [Inguinal Lymph Nodes Enlarged Bilaterally] : inguinal [Musculoskeletal - Swelling] : no joint swelling [Range of Motion to Joints] : range of motion to joints [Nail Clubbing] : no clubbing  or cyanosis of the fingernails [No Focal Deficits] : no focal deficits [Oriented To Time, Place, And Person] : oriented to person, place, and time [Affect] : the affect was normal [de-identified] : radiation changes to treated area - erythema/ edema/ skin thickening vulva  R> left with some tenderness, genital edema present

## 2023-05-22 ENCOUNTER — APPOINTMENT (OUTPATIENT)
Dept: GYNECOLOGIC ONCOLOGY | Facility: CLINIC | Age: 73
End: 2023-05-22
Payer: MEDICARE

## 2023-05-22 VITALS
HEIGHT: 57 IN | TEMPERATURE: 208.58 F | SYSTOLIC BLOOD PRESSURE: 136 MMHG | DIASTOLIC BLOOD PRESSURE: 61 MMHG | HEART RATE: 61 BPM | BODY MASS INDEX: 25.67 KG/M2 | WEIGHT: 119 LBS | OXYGEN SATURATION: 98 %

## 2023-05-22 PROCEDURE — 99214 OFFICE O/P EST MOD 30 MIN: CPT

## 2023-07-19 ENCOUNTER — OUTPATIENT (OUTPATIENT)
Dept: OUTPATIENT SERVICES | Facility: HOSPITAL | Age: 73
LOS: 1 days | End: 2023-07-19
Payer: MEDICARE

## 2023-07-19 ENCOUNTER — APPOINTMENT (OUTPATIENT)
Dept: NUCLEAR MEDICINE | Facility: IMAGING CENTER | Age: 73
End: 2023-07-19
Payer: MEDICARE

## 2023-07-19 DIAGNOSIS — Z90.710 ACQUIRED ABSENCE OF BOTH CERVIX AND UTERUS: Chronic | ICD-10-CM

## 2023-07-19 DIAGNOSIS — C52 MALIGNANT NEOPLASM OF VAGINA: ICD-10-CM

## 2023-07-19 PROCEDURE — A9552: CPT

## 2023-07-19 PROCEDURE — 78815 PET IMAGE W/CT SKULL-THIGH: CPT | Mod: 26,PS

## 2023-07-19 PROCEDURE — 78815 PET IMAGE W/CT SKULL-THIGH: CPT

## 2023-07-25 ENCOUNTER — APPOINTMENT (OUTPATIENT)
Dept: RADIATION ONCOLOGY | Facility: CLINIC | Age: 73
End: 2023-07-25
Payer: MEDICARE

## 2023-07-25 VITALS
WEIGHT: 116.51 LBS | TEMPERATURE: 99.14 F | OXYGEN SATURATION: 99 % | HEART RATE: 67 BPM | BODY MASS INDEX: 25.14 KG/M2 | RESPIRATION RATE: 17 BRPM | HEIGHT: 57 IN | DIASTOLIC BLOOD PRESSURE: 72 MMHG | SYSTOLIC BLOOD PRESSURE: 151 MMHG

## 2023-07-25 PROCEDURE — 99214 OFFICE O/P EST MOD 30 MIN: CPT

## 2023-07-26 NOTE — HISTORY OF PRESENT ILLNESS
[FreeTextEntry1] : 72 year-old female with T1b vaginal cancer (SCC), at least 4 cm lesion at introitus to mid vagina on right wall to rectovaginal septum, s/p Cisplatin/RT \par \par PET/CT 5/10/22 showed SUV 13.0 avidity in vagina. No avid LAD or distant disease. MRI 6/12/22 showed a 3.6 x 3.3 x 2 cm lesion in the vagina only. \par External beam chemoradiation to the pelvis recommended and completed on 8/1/22, with excellent response; interval MRI on 8/4/22 showed complete resolution of the vaginal primary tumor. Radiation treatment was completed with vaginal cuff brachytherapy boost to the posterior vaginal wall. Her treatment course is summarized as follows: \par \par Radiation Treatment Summary: \par Pelvis/Vagina  5,000 cGy from 6/27/2022 - 8/01/2022  25 fx \par Vaginal Boost  2,100 cGy from  8/24/2022 - 9/02/2022 3 fx\par \par 8/4/22: MRI pelvis: No residual vaginal mass can be identified status post therapy\par \par 10/13/22 Telephonic PTE: Feels generally well and offers no acute complaints. Denies abdominal pain, vaginal discharge/bleeding, dysuria incontinence or bowel complaints. \par \par Followed up with Dr. Loredo 9/12/22 and had a negative Gyn exam. She also saw Dr. Blackwell with plan for PET/CT. Repeat exam by Dr. Loredo 12/12/22 also benign, although very uncomfortable per patient. \par \par 12/22/22: PET/CT: Small FDG-avid focus in the vagina, significantly decreased in size and metabolism as compared to prior study, is indeterminate. This may represent a focus of residual disease versus posttreatment inflammation.  New postradiation therapy changes, skin and subcutaneous fat/muscles of lower anterior abdominal wall and pelvis. No scan evidence of metastatic disease\par \par 1/13/23: follow up\par Feels generally well but notes intermittent groin discomfort, numbness and labial pruritus.  Denies abdominal pain, vaginal discharge/bleeding, dysuria incontinence. Taking Miralax for constipation. Using Vaginal dilator size #1 3 x week; initially very uncomfortable but has improved. \par \par 4/25/23: follow up for new onset skin issues\par Notes right groin & vulvar tenderness to palpation & erythema; thickened skin with intermittent itching. Some improvement with moisturizer. Denies abdominal pain, vaginal discharge/bleeding,  dysuria or bowel complaints. Using Vaginal dilator.\par \par 7/19/2023: PET/CT, Resolution of small FDG-avid focus in the vagina. New, mildly increased FDG activity in anorectal region may reflect inflammation. Mildly FDG-avid skin thickening and haziness in the subcutaneous fat/muscles of the lower anterior abdominal wall and pelvis are increased as compared to prior study, likely evolving postradiation therapy changes. \par \par 7/25/2023: Seen in follow visit. \par Vaginal itching is resolved. Genital swelling(mons pubis) still present although decreased. Denies pain. LE swelling localized to ankle(+1) edema bilaterally. Notes some vaginal dryness with "sticking" when she moves her legs. She is not using Vaginal dilator as instructed. Reports the small size is uncomfortable with lubricants, notes some bleeding after.\par Denies vaginal discharge, bleeding when not using dilator, diarrhea, constipation or pelvic pain.

## 2023-07-26 NOTE — PHYSICAL EXAM
[Normal] : well developed, well nourished, in no acute distress [Sclera] : the sclera and conjunctiva were normal [Extraocular Movements] : extraocular movements were intact [Outer Ear] : the ears and nose were normal in appearance [Hearing Threshold Finger Rub Not Loving] : hearing was normal [] : no respiratory distress [Exaggerated Use Of Accessory Muscles For Inspiration] : no accessory muscle use [Respiration, Rhythm And Depth] : normal respiratory rhythm and effort [Arterial Pulses Normal] : the arterial pulses were normal [Abdomen Soft] : soft [Nondistended] : nondistended [Abdomen Tenderness] : non-tender [Femoral Lymph Nodes Enlarged Bilaterally] : femoral [Inguinal Lymph Nodes Enlarged Bilaterally] : inguinal [Skin Color & Pigmentation] : normal skin color and pigmentation [No Focal Deficits] : no focal deficits [Oriented To Time, Place, And Person] : oriented to person, place, and time [Affect] : the affect was normal [Not Anxious] : not anxious [de-identified] : bilateral lower extremity edema, pitting at ankles [de-identified] : significant vaginal stenosis and some friability with easy bleeding on exam; could not fully insert small speculum, no lesions noted  [de-identified] :  perivulvar/inguinal edema  [de-identified] :  pedal edema b/l

## 2023-07-26 NOTE — END OF VISIT
[Time Spent: ___ minutes] : I have spent [unfilled] minutes of time on the encounter. [FreeTextEntry3] : I saw and examined this patient on the date of service with my assigned resident physician, Dr. Raz Roberts. I was involved in all procedures and laboratory/radiographic assessments. I personally confirmed pertinent history and exam findings and reviewed the patient's diagnosis and plan with them. I have reviewed and edited the resident note and agree with their overall plan. Additional comments below.\par \par 72F with IB vaginal cancer, at least 4 cm lesion at introitus to mid vagina on right wall to rectovaginal septum. SCC noted on a vaginal biopsy 4/5/22, well-to-moderately differentiated (previously on outside biopsy from 4/14/22, was felt to be SCC on review). CT A/P 4/14/22 showed a 5.3 x 2.2 x 3.6 cm mass in the vagina. PET/CT 5/10/22 showed SUV 13.0 avidity in vagina. No avid LAD or distant disease. MR staging showed 3.6 x 3.3 x 2 cm mass in vagina contacting posterior wall without clear extravaginal extension. She is s/p external beam chemoradiation to the pelvis/vagina and vaginal cuff brachytherapy boost to the posterior vaginal wall 9/2/22.\par \par She is doing relatively well overall, with no clear clinical evidence of recurrence; her perivulvar edema is a little better, but she does have some lower extremity edema worsening and we will arrange ultrasound. We reviewed skin care and use of moisturizers, massage, and light exercise, and we provided smaller dilators and reinforced their use. She will continue to follow med and gyne/onc; we will follow up in late November/early December unless otherwise indicated. We will arrange imaging of the chest and MRI pelvis prior.

## 2023-07-26 NOTE — REVIEW OF SYSTEMS
[Negative] : ENT [Localized Edema: Grade 1 - Localized to dependent areas, no disability or functional impairment] : Localized Edema: Grade 1 - Localized to dependent areas, no disability or functional impairment [Constipation: Grade 0] : Constipation: Grade 0 [Diarrhea: Grade 0] : Diarrhea: Grade 0 [Fatigue: Grade 1 - Fatigue relieved by rest] : Fatigue: Grade 1 - Fatigue relieved by rest [Urinary Incontinence: Grade 0] : Urinary Incontinence: Grade 0  [Urinary Urgency: Grade 0] : Urinary Urgency: Grade 0 [Urinary Frequency: Grade 0] : Urinary Frequency: Grade 0 [Genital Edema: Grade 1 - Mild swelling or obscuration of anatomic architecture on close inspection] : Genital Edema: Grade 1 - Mild swelling or obscuration of anatomic architecture on close inspection [FreeTextEntry9] : b/l mild pedal edema [FreeTextEntry4] : mons pubis

## 2023-08-18 ENCOUNTER — OUTPATIENT (OUTPATIENT)
Dept: OUTPATIENT SERVICES | Facility: HOSPITAL | Age: 73
LOS: 1 days | End: 2023-08-18
Payer: COMMERCIAL

## 2023-08-18 ENCOUNTER — TRANSCRIPTION ENCOUNTER (OUTPATIENT)
Age: 73
End: 2023-08-18

## 2023-08-18 ENCOUNTER — APPOINTMENT (OUTPATIENT)
Dept: ULTRASOUND IMAGING | Facility: CLINIC | Age: 73
End: 2023-08-18
Payer: MEDICARE

## 2023-08-18 DIAGNOSIS — Z90.710 ACQUIRED ABSENCE OF BOTH CERVIX AND UTERUS: Chronic | ICD-10-CM

## 2023-08-18 DIAGNOSIS — C52 MALIGNANT NEOPLASM OF VAGINA: ICD-10-CM

## 2023-08-18 PROCEDURE — 93970 EXTREMITY STUDY: CPT

## 2023-08-18 PROCEDURE — 93970 EXTREMITY STUDY: CPT | Mod: 26

## 2023-08-27 PROBLEM — C52 VAGINAL CANCER: Status: ACTIVE | Noted: 2022-05-26

## 2023-08-28 ENCOUNTER — APPOINTMENT (OUTPATIENT)
Dept: GYNECOLOGIC ONCOLOGY | Facility: CLINIC | Age: 73
End: 2023-08-28
Payer: MEDICARE

## 2023-08-28 VITALS
WEIGHT: 114 LBS | DIASTOLIC BLOOD PRESSURE: 58 MMHG | RESPIRATION RATE: 17 BRPM | SYSTOLIC BLOOD PRESSURE: 144 MMHG | HEART RATE: 74 BPM | OXYGEN SATURATION: 100 % | BODY MASS INDEX: 24.59 KG/M2 | HEIGHT: 57 IN

## 2023-08-28 DIAGNOSIS — C52 MALIGNANT NEOPLASM OF VAGINA: ICD-10-CM

## 2023-08-28 PROCEDURE — 99214 OFFICE O/P EST MOD 30 MIN: CPT

## 2023-08-28 NOTE — HISTORY OF PRESENT ILLNESS
[FreeTextEntry1] : GYN/Ref: Dr. More PCP: Rebecca Swift MD  Ms. Perez, 72 years old, referred by Dr. More for stage 3 vaginal cancer. Patient is now s/p chemo/RT , completed 2022. Today patient is c/o left lower ext swelling and mons pubis swelling,started in the last few wks otherwise patient is feeling well, no bleeding, started using larger dilater, then developed UTI - received Abx from PCP.  Pathology: 22 vaginal mass biopsy: squamous cell carcinoma, well to moderately differentiated.  5/3/22 slide consultation report by Hilda from [facility] of 22: Squamous cell carcinoma  Patient is now s/p Cis/RT - held one wk of Cis due to counts. Received EBRT and Vag Brachy.  Patient is feelign well, using dilators 3x/wk, no new pain, or bleeding.  Imagin2023 lower extremity duplex ([facility]) No evidence of DVT.  2023 PET/CT ([facility]) IMPRESSION: Compared to FDG PET/CT dated 2022: 1. Resolution of small FDG-avid focus in the vagina. 2. New, mildly increased FDG activity in anorectal region may reflect inflammation. Please correlate clinically. 3. Mildly FDG-avid skin thickening and haziness in the subcutaneous fat/muscles of the lower anterior abdominal wall and pelvis are increased as compared to prior study, likely evolving postradiation therapy changes. Please correlate clinically for confirmation.  Health Maintenance: Mammogram:  2022 Colonoscopy: 2022 DEXA: PAP:

## 2023-08-28 NOTE — DISCUSSION/SUMMARY
[Reviewed Clinical Lab Test(s)] : Results of clinical tests were reviewed. [Reviewed Radiology Report(s)] : Radiology reports were reviewed. [Reviewed Radiology Film/Image(s)] : Images from radiology studies were reviewed and examined. [FreeTextEntry1] : Patient is clinically MELL,  PET CT stable, no evidence of disease on exam Patient use larger dilator, developed UTI.  Patient was treated for urinary tract infection by PCP.  Patient has follow-up with PCP next week for repeat urinary culture.  Patient also has follow-up with radiation oncology.  Patient has to go to the [facility] for emergency situation with her sister. Patient will follow-up in 3 months. all questions answered in detail.

## 2023-08-28 NOTE — PHYSICAL EXAM
[Chaperone Present] : A chaperone was present in the examining room during all aspects of the physical examination [Absent] : Adnexa(ae): Absent [Normal] : Recto-Vaginal Exam: Normal [Fully active, able to carry on all pre-disease performance without restriction] : Status 0 - Fully active, able to carry on all pre-disease performance without restriction [de-identified] : +radiation changes, no new lesions, resolved prior mass, small bleeding at speculum insertion, pediatric speculum used today [de-identified] : smooth rectovag septum, no cul de sac nodules

## 2023-12-06 ENCOUNTER — APPOINTMENT (OUTPATIENT)
Dept: RADIATION ONCOLOGY | Facility: CLINIC | Age: 73
End: 2023-12-06
Payer: MEDICARE

## 2023-12-06 VITALS
WEIGHT: 112.43 LBS | HEIGHT: 57 IN | TEMPERATURE: 97 F | DIASTOLIC BLOOD PRESSURE: 71 MMHG | BODY MASS INDEX: 24.26 KG/M2 | OXYGEN SATURATION: 100 % | RESPIRATION RATE: 18 BRPM | SYSTOLIC BLOOD PRESSURE: 144 MMHG | HEART RATE: 66 BPM

## 2023-12-06 PROCEDURE — 99214 OFFICE O/P EST MOD 30 MIN: CPT

## 2023-12-06 RX ORDER — AMOXICILLIN AND CLAVULANATE POTASSIUM 875; 125 MG/1; MG/1
875-125 TABLET, COATED ORAL
Qty: 15 | Refills: 0 | Status: DISCONTINUED | COMMUNITY
Start: 2023-04-25 | End: 2023-12-06

## 2023-12-07 ENCOUNTER — NON-APPOINTMENT (OUTPATIENT)
Age: 73
End: 2023-12-07

## 2023-12-10 PROBLEM — C52 VAGINAL CANCER: Status: ACTIVE | Noted: 2022-04-25

## 2023-12-10 PROBLEM — R92.8 ABNORMAL MAMMOGRAM OF LEFT BREAST: Status: ACTIVE | Noted: 2023-12-10

## 2023-12-11 ENCOUNTER — APPOINTMENT (OUTPATIENT)
Dept: GYNECOLOGIC ONCOLOGY | Facility: CLINIC | Age: 73
End: 2023-12-11
Payer: MEDICARE

## 2023-12-11 VITALS
SYSTOLIC BLOOD PRESSURE: 150 MMHG | HEART RATE: 73 BPM | HEIGHT: 57 IN | BODY MASS INDEX: 24.38 KG/M2 | WEIGHT: 113 LBS | DIASTOLIC BLOOD PRESSURE: 70 MMHG

## 2023-12-11 DIAGNOSIS — R92.8 OTHER ABNORMAL AND INCONCLUSIVE FINDINGS ON DIAGNOSTIC IMAGING OF BREAST: ICD-10-CM

## 2023-12-11 DIAGNOSIS — C52 MALIGNANT NEOPLASM OF VAGINA: ICD-10-CM

## 2023-12-11 PROCEDURE — 99214 OFFICE O/P EST MOD 30 MIN: CPT

## 2023-12-11 RX ORDER — TACROLIMUS 1 MG/G
0.1 OINTMENT TOPICAL TWICE DAILY
Qty: 1 | Refills: 0 | Status: ACTIVE | COMMUNITY
Start: 2023-12-11 | End: 1900-01-01

## 2023-12-13 NOTE — END OF VISIT
[FreeTextEntry3] : I saw and examined this patient on the date of service with my assigned resident physician, Dr. Rishi Poole. I was involved in all procedures and laboratory/radiographic assessments. I personally confirmed pertinent history and exam findings and reviewed the patient's diagnosis and plan with them. I have reviewed and edited the resident note and agree with their overall plan. Additional comments below.  72F with IB vaginal cancer, at least 4 cm lesion at introitus to mid vagina on right wall to rectovaginal septum. SCC noted on a vaginal biopsy 4/5/22, well-to-moderately differentiated (previously on outside biopsy from 4/14/22, was felt to be SCC on review). CT A/P 4/14/22 showed a 5.3 x 2.2 x 3.6 cm mass in the vagina. PET/CT 5/10/22 showed SUV 13.0 avidity in vagina. No avid LAD or distant disease. MR staging showed 3.6 x 3.3 x 2 cm mass in vagina contacting posterior wall without clear extravaginal extension. She is s/p external beam chemoradiation to the pelvis/vagina and vaginal cuff brachytherapy boost to the posterior vaginal wall 9/2/22.  She is doing relatively well overall, with no clear radiographic or clinical evidence of recurrence; her perivulvar edema and lower extremity edema persist. We reviewed skin care and use of moisturizers, massage, and light exercise, and we provided smaller dilators and reinforced their use. We reviewed compression stockings and will refer her to lymphedema clinic.  She will continue to follow med and gyne/onc; she does not currently have followup with Dr. Blackwell but does have followup with Dr. Loredo. We will follow up in 6 months unless otherwise indicated. We will arrange imaging of the chest and MRI pelvis soon though as she is due for imaging.   Alejandro Allan MD PhD  and Chief of Brachytherapy Department of Radiation Medicine [facility] Tel: [phone]

## 2023-12-13 NOTE — HISTORY OF PRESENT ILLNESS
[FreeTextEntry1] : 72 year-old female with T1b vaginal cancer (SCC), at least 4 cm lesion at introitus to mid vagina on right wall to rectovaginal septum, s/p Cisplatin/RT   PET/CT 5/10/22 showed SUV 13.0 avidity in vagina. No avid LAD or distant disease. MRI 6/12/22 showed a 3.6 x 3.3 x 2 cm lesion in the vagina only.  External beam chemoradiation to the pelvis recommended and completed on 8/1/22, with excellent response; interval MRI on 8/4/22 showed complete resolution of the vaginal primary tumor. Radiation treatment was completed with vaginal cuff brachytherapy boost to the posterior vaginal wall. Her treatment course is summarized as follows:   Radiation Treatment Summary:  Pelvis/Vagina  5,000 cGy from 6/27/2022 - 8/01/2022  25 fx  Vaginal Boost  2,100 cGy from  8/24/2022 - 9/02/2022 3 fx  8/4/22: MRI pelvis: No residual vaginal mass can be identified status post therapy  10/13/22 Telephonic PTE: Feels generally well and offers no acute complaints. Denies abdominal pain, vaginal discharge/bleeding, dysuria incontinence or bowel complaints.   Followed up with Dr. Loredo 9/12/22 and had a negative Gyn exam. She also saw Dr. Blackwell with plan for PET/CT. Repeat exam by Dr. Loredo 12/12/22 also benign, although very uncomfortable per patient.   12/22/22: PET/CT: Small FDG-avid focus in the vagina, significantly decreased in size and metabolism as compared to prior study, is indeterminate. This may represent a focus of residual disease versus posttreatment inflammation.  New postradiation therapy changes, skin and subcutaneous fat/muscles of lower anterior abdominal wall and pelvis. No scan evidence of metastatic disease  1/13/23: follow up Feels generally well but notes intermittent groin discomfort, numbness and labial pruritus.  Denies abdominal pain, vaginal discharge/bleeding, dysuria incontinence. Taking Miralax for constipation. Using Vaginal dilator size #1 3 x week; initially very uncomfortable but has improved.   4/25/23: follow up for new onset skin issues Notes right groin & vulvar tenderness to palpation & erythema; thickened skin with intermittent itching. Some improvement with moisturizer. Denies abdominal pain, vaginal discharge/bleeding,  dysuria or bowel complaints. Using Vaginal dilator.  7/19/2023: PET/CT, Resolution of small FDG-avid focus in the vagina. New, mildly increased FDG activity in anorectal region may reflect inflammation. Mildly FDG-avid skin thickening and haziness in the subcutaneous fat/muscles of the lower anterior abdominal wall and pelvis are increased as compared to prior study, likely evolving postradiation therapy changes.   7/25/2023: Seen in follow visit.  Vaginal itching is resolved. Genital swelling (mons pubis) still present although decreased. Denies pain. LE swelling localized to ankle (+1) edema bilaterally. Notes some vaginal dryness with "sticking" when she moves her legs. She is not using Vaginal dilator as instructed. Reports the small size is uncomfortable with lubricants, notes some bleeding after. Denies vaginal discharge, bleeding when not using dilator, diarrhea, constipation or pelvic pain.  12/6/2023: Seen in follow up visit: She had + ankle edema during last visit and was sent for b/l lower extremities US, showed no evidence of deep venous thrombosis in either lower extremity. She has persistent lower extremity edema that does worsen throughout the day; does use support hose sometimes.  She has been intermittently using her smaller sized dilators. Most recently she developed UTI and was treated by PCP and has followed-up with PCP with repeat urinary culture. She continues to follow Dr. Loredo. No complaints of pain. No vaginal bleeding or discharge.

## 2023-12-13 NOTE — PHYSICAL EXAM
[de-identified] : bilateral 2+ lower extremity edema [de-identified] : Vulvar/perivulvar edema present. Vaginal exam reveals a narrowed vagina that is not able to completely accomodate a small speculum with significant discomfort; no lesions visible on limited exam. Radiation changes noted.

## 2023-12-21 RX ORDER — CONJUGATED ESTROGENS 0.62 MG/G
0.62 CREAM VAGINAL
Qty: 30 | Refills: 0 | Status: ACTIVE | COMMUNITY
Start: 2023-12-11 | End: 1900-01-01

## 2024-01-26 ENCOUNTER — APPOINTMENT (OUTPATIENT)
Dept: NUCLEAR MEDICINE | Facility: CLINIC | Age: 74
End: 2024-01-26

## 2024-01-26 ENCOUNTER — RESULT REVIEW (OUTPATIENT)
Age: 74
End: 2024-01-26

## 2024-01-26 ENCOUNTER — APPOINTMENT (OUTPATIENT)
Dept: NUCLEAR MEDICINE | Facility: CLINIC | Age: 74
End: 2024-01-26
Payer: MEDICARE

## 2024-01-26 ENCOUNTER — APPOINTMENT (OUTPATIENT)
Dept: MRI IMAGING | Facility: CLINIC | Age: 74
End: 2024-01-26

## 2024-01-26 PROCEDURE — A9585: CPT

## 2024-01-26 PROCEDURE — 72197 MRI PELVIS W/O & W/DYE: CPT

## 2024-01-26 PROCEDURE — A9552: CPT

## 2024-01-26 PROCEDURE — 78815 PET IMAGE W/CT SKULL-THIGH: CPT | Mod: PS

## 2024-01-31 ENCOUNTER — NON-APPOINTMENT (OUTPATIENT)
Age: 74
End: 2024-01-31

## 2024-03-08 NOTE — REASON FOR VISIT
[FreeTextEntry1] : Follow up - vaginal cancer  5/16/2023 mammogram BI-RADS 0 needs additional imaging ([facility]).  Order placed.

## 2024-03-08 NOTE — HISTORY OF PRESENT ILLNESS
[FreeTextEntry1] : GYN/Ref: Dr. More PCP: Rebecca Swift MD  Ms. Perez, 72 years old, referred by Dr. More for stage 3 vaginal cancer. Patient is now s/p chemo/RT , completed 2022. Today patient is c/o left lower ext swelling and mons pubis swelling,started in the last few wks otherwise patient is feeling well, no bleeding, started using larger dilater, then developed UTI - received Abx from PCP.  Patient has new onset of bilateral lower extremity lymphedema.  She was referred to [facility] rehab for lymphedema treatment by Dr. Allan  Pathology: 22 vaginal mass biopsy: squamous cell carcinoma, well to moderately differentiated.  5/3/22 slide consultation report by Hilda from [facility] of 22: Squamous cell carcinoma  Patient is now s/p Cis/RT - held one wk of Cis due to counts. Received EBRT and Vag Brachy.  Patient is feelign well, using dilators 3x/wk, no new pain, or bleeding.  Imagin2024 PET/CT ([facility]) 1. Mild FDG activity in anorectal region, decreased from prior study, likely reflects inflammation related to prior radiation therapy. 2. Mildly FDG-avid skin thickening and subcutaneous haziness in the subcutaneous fat/muscles of lower anterior abdominal wall and pelvis, not significantly changed, compatible with postradiation therapy changes. 3. No scan evidence of recurrent or metastatic disease.   2023 CT A/P (R) 1. No acute process in the abdomen or pelvis. 2. Negative for urinary tract calculi or hydronephrosis. 3. Compression fracture of the T11 vertebral body, new compared to 2022. 4. Cholelithiasis. 5. Hysterectomy.   2023 lower extremity duplex ([facility]) No evidence of DVT.   Health Maintenance: Mammogram:  2023 BIRADS 0 ([facility]) needs additional imaging.  Colonoscopy: 2022 DEXA: PAP:

## 2024-03-11 ENCOUNTER — APPOINTMENT (OUTPATIENT)
Dept: GYNECOLOGIC ONCOLOGY | Facility: CLINIC | Age: 74
End: 2024-03-11

## 2024-06-06 NOTE — DISEASE MANAGEMENT
[Clinical] : TNM Stage: c [FreeTextEntry4] : at least FIGO IB; pending MRI staging [TTNM] : 1b [MTNM] : 0 [NTNM] : 0 [IB] : IB

## 2024-06-06 NOTE — VITALS
[Maximal Pain Intensity: 0/10] : 0/10 [80: Normal activity with effort; some signs or symptoms of disease.] : 80: Normal activity with effort; some signs or symptoms of disease.  [ECOG Performance Status: 0 - Fully active, able to carry on all pre-disease performance without restriction] : Performance Status: 0 - Fully active, able to carry on all pre-disease performance without restriction

## 2024-06-06 NOTE — REVIEW OF SYSTEMS
[Constipation: Grade 0] : Constipation: Grade 0 [Diarrhea: Grade 0] : Diarrhea: Grade 0 [Dysphagia: Grade 0] : Dysphagia: Grade 0 [Fatigue: Grade 0] : Fatigue: Grade 0 [Urinary Urgency: Grade 0] : Urinary Urgency: Grade 0 [Urinary Frequency: Grade 0] : Urinary Frequency: Grade 0 [Genital Edema: Grade 1 - Mild swelling or obscuration of anatomic architecture on close inspection] : Genital Edema: Grade 1 - Mild swelling or obscuration of anatomic architecture on close inspection [Vaginal Stricture: Grade 2 - Vaginal narrowing and/or shortening not interfering with physical examination] : Vaginal Stricture: Grade 2 - Vaginal narrowing and/or shortening not interfering with physical examination [Cough: Grade 0] : Cough: Grade 0 [Skin Atrophy: Grade 0] : Skin Atrophy: Grade 0 [Skin Induration: Grade 0] : Skin Induration: Grade 0

## 2024-06-06 NOTE — HISTORY OF PRESENT ILLNESS
[FreeTextEntry1] : 73 year-old female with T1b vaginal cancer (SCC), at least 4 cm lesion at introitus to mid vagina on right wall to rectovaginal septum, s/p Cisplatin/RT   PET/CT 5/10/22 showed SUV 13.0 avidity in vagina. No avid LAD or distant disease. MRI 6/12/22 showed a 3.6 x 3.3 x 2 cm lesion in the vagina only.  External beam chemoradiation to the pelvis recommended and completed on 8/1/22, with excellent response; interval MRI on 8/4/22 showed complete resolution of the vaginal primary tumor. Radiation treatment was completed with vaginal cuff brachytherapy boost to the posterior vaginal wall. Her treatment course is summarized as follows:   Radiation Treatment Summary:  Pelvis/Vagina  5,000 cGy from 6/27/2022 - 8/01/2022  25 fx  Vaginal Boost  2,100 cGy from  8/24/2022 - 9/02/2022 3 fx  8/4/22: MRI pelvis: No residual vaginal mass can be identified status post therapy  10/13/22 Telephonic PTE: Feels generally well and offers no acute complaints. Denies abdominal pain, vaginal discharge/bleeding, dysuria incontinence or bowel complaints.   Followed up with Dr. Loredo 9/12/22 and had a negative Gyn exam. She also saw Dr. Blackwell with plan for PET/CT. Repeat exam by Dr. Loredo 12/12/22 also benign, although very uncomfortable per patient.   12/22/22: PET/CT: Small FDG-avid focus in the vagina, significantly decreased in size and metabolism as compared to prior study, is indeterminate. This may represent a focus of residual disease versus posttreatment inflammation.  New postradiation therapy changes, skin and subcutaneous fat/muscles of lower anterior abdominal wall and pelvis. No scan evidence of metastatic disease  1/13/23: follow up Feels generally well but notes intermittent groin discomfort, numbness and labial pruritus.  Denies abdominal pain, vaginal discharge/bleeding, dysuria incontinence. Taking Miralax for constipation. Using Vaginal dilator size #1 3 x week; initially very uncomfortable but has improved.   4/25/23: follow up for new onset skin issues Notes right groin & vulvar tenderness to palpation & erythema; thickened skin with intermittent itching. Some improvement with moisturizer. Denies abdominal pain, vaginal discharge/bleeding,  dysuria or bowel complaints. Using Vaginal dilator.  7/19/2023: PET/CT, Resolution of small FDG-avid focus in the vagina. New, mildly increased FDG activity in anorectal region may reflect inflammation. Mildly FDG-avid skin thickening and haziness in the subcutaneous fat/muscles of the lower anterior abdominal wall and pelvis are increased as compared to prior study, likely evolving postradiation therapy changes.   7/25/2023: Seen in follow visit.  Vaginal itching is resolved. Genital swelling (mons pubis) still present although decreased. Denies pain. LE swelling localized to ankle (+1) edema bilaterally. Notes some vaginal dryness with "sticking" when she moves her legs. She is not using Vaginal dilator as instructed. Reports the small size is uncomfortable with lubricants, notes some bleeding after. Denies vaginal discharge, bleeding when not using dilator, diarrhea, constipation or pelvic pain.  12/6/2023: Seen in follow up visit: She had + ankle edema during last visit and was sent for b/l lower extremities US, showed no evidence of deep venous thrombosis in either lower extremity. She has persistent lower extremity edema that does worsen throughout the day; does use support hose sometimes.  She has been intermittently using her smaller sized dilators. Most recently she developed UTI and was treated by PCP and has followed-up with PCP with repeat urinary culture. She continues to follow Dr. Loredo. No complaints of pain. No vaginal bleeding or discharge.  - MR pelvis 1/26/24: No recurrent or residual vaginal mass. No pelvic lymphadenopathy. - PET/CT 1/26/24: No scan evidence of recurrent or metastatic disease. Mild FDG activity in anorectal region, decreased from prior study, likely reflects inflammation related to prior radiation therapy. Mildly FDG-avid skin thickening and subcutaneous haziness in the subcutaneous fat/muscles of lower anterior abdominal wall and pelvis, not significantly changed, compatible with postradiation therapy changes.  - Missed followup with Dr. Loredo in March; scheduled to see her 7/8/24  Followup 6/11/24:  *** imaging not scheduled *** She has had some benefit from lymphedema massage, pain is much less. Using dilator to good effect; not noting any more bleeding.  *** needs a pelvic exam

## 2024-06-06 NOTE — PHYSICAL EXAM
[Sclera] : the sclera and conjunctiva were normal [Extraocular Movements] : extraocular movements were intact [Outer Ear] : the ears and nose were normal in appearance [Hearing Threshold Finger Rub Not Foard] : hearing was normal [] : no respiratory distress [Respiration, Rhythm And Depth] : normal respiratory rhythm and effort [Exaggerated Use Of Accessory Muscles For Inspiration] : no accessory muscle use [Arterial Pulses Normal] : the arterial pulses were normal [Abdomen Soft] : soft [Nondistended] : nondistended [Abdomen Tenderness] : non-tender [Inguinal Lymph Nodes Enlarged Bilaterally] : inguinal [Musculoskeletal - Swelling] : no joint swelling [Range of Motion to Joints] : range of motion to joints [Nail Clubbing] : no clubbing  or cyanosis of the fingernails [Motor Tone] : muscle strength and tone were normal [No Focal Deficits] : no focal deficits [Skin Color & Pigmentation] : normal skin color and pigmentation [Normal] : oriented to person, place and time, the affect was normal, the mood was normal and not anxious [de-identified] : bilateral 2+ lower extremity edema [de-identified] : Vulvar/perivulvar edema present. Vaginal exam reveals a narrowed vagina that is not able to completely accomodate a small speculum with significant discomfort; no lesions visible on limited exam. Radiation changes noted.

## 2024-06-11 ENCOUNTER — APPOINTMENT (OUTPATIENT)
Dept: RADIATION ONCOLOGY | Facility: CLINIC | Age: 74
End: 2024-06-11

## 2024-07-08 ENCOUNTER — APPOINTMENT (OUTPATIENT)
Dept: GYNECOLOGIC ONCOLOGY | Facility: CLINIC | Age: 74
End: 2024-07-08
Payer: MEDICARE

## 2024-07-08 VITALS
HEIGHT: 57 IN | WEIGHT: 115 LBS | OXYGEN SATURATION: 64 % | TEMPERATURE: 98 F | BODY MASS INDEX: 24.81 KG/M2 | HEART RATE: 65 BPM | RESPIRATION RATE: 17 BRPM | DIASTOLIC BLOOD PRESSURE: 57 MMHG | SYSTOLIC BLOOD PRESSURE: 130 MMHG

## 2024-07-08 DIAGNOSIS — I89.0 LYMPHEDEMA, NOT ELSEWHERE CLASSIFIED: ICD-10-CM

## 2024-07-08 PROCEDURE — 99214 OFFICE O/P EST MOD 30 MIN: CPT

## 2024-07-09 LAB — HPV HIGH+LOW RISK DNA PNL CVX: NOT DETECTED

## 2024-07-16 LAB — CYTOLOGY CVX/VAG DOC THIN PREP: ABNORMAL

## 2024-07-18 ENCOUNTER — APPOINTMENT (OUTPATIENT)
Dept: NUCLEAR MEDICINE | Facility: IMAGING CENTER | Age: 74
End: 2024-07-18

## 2024-07-18 ENCOUNTER — OUTPATIENT (OUTPATIENT)
Dept: OUTPATIENT SERVICES | Facility: HOSPITAL | Age: 74
LOS: 1 days | End: 2024-07-18
Payer: COMMERCIAL

## 2024-07-18 DIAGNOSIS — C52 MALIGNANT NEOPLASM OF VAGINA: ICD-10-CM

## 2024-07-18 DIAGNOSIS — Z00.8 ENCOUNTER FOR OTHER GENERAL EXAMINATION: ICD-10-CM

## 2024-07-18 DIAGNOSIS — Z90.710 ACQUIRED ABSENCE OF BOTH CERVIX AND UTERUS: Chronic | ICD-10-CM

## 2024-07-18 PROCEDURE — A9552: CPT

## 2024-07-18 PROCEDURE — 78815 PET IMAGE W/CT SKULL-THIGH: CPT

## 2024-07-18 PROCEDURE — 78815 PET IMAGE W/CT SKULL-THIGH: CPT | Mod: 26,PS

## 2024-07-26 ENCOUNTER — NON-APPOINTMENT (OUTPATIENT)
Age: 74
End: 2024-07-26

## 2024-10-14 ENCOUNTER — APPOINTMENT (OUTPATIENT)
Dept: GYNECOLOGIC ONCOLOGY | Facility: CLINIC | Age: 74
End: 2024-10-14
Payer: MEDICARE

## 2024-10-14 VITALS
OXYGEN SATURATION: 98 % | HEIGHT: 57 IN | BODY MASS INDEX: 24.81 KG/M2 | WEIGHT: 115 LBS | DIASTOLIC BLOOD PRESSURE: 70 MMHG | SYSTOLIC BLOOD PRESSURE: 168 MMHG | HEART RATE: 65 BPM | RESPIRATION RATE: 16 BRPM

## 2024-10-14 DIAGNOSIS — C52 MALIGNANT NEOPLASM OF VAGINA: ICD-10-CM

## 2024-10-14 PROCEDURE — 99214 OFFICE O/P EST MOD 30 MIN: CPT

## 2024-10-14 PROCEDURE — 99459 PELVIC EXAMINATION: CPT

## 2024-10-25 ENCOUNTER — APPOINTMENT (OUTPATIENT)
Dept: NUCLEAR MEDICINE | Facility: IMAGING CENTER | Age: 74
End: 2024-10-25
Payer: MEDICARE

## 2024-10-25 ENCOUNTER — OUTPATIENT (OUTPATIENT)
Dept: OUTPATIENT SERVICES | Facility: HOSPITAL | Age: 74
LOS: 1 days | End: 2024-10-25
Payer: COMMERCIAL

## 2024-10-25 DIAGNOSIS — C52 MALIGNANT NEOPLASM OF VAGINA: ICD-10-CM

## 2024-10-25 DIAGNOSIS — Z90.710 ACQUIRED ABSENCE OF BOTH CERVIX AND UTERUS: Chronic | ICD-10-CM

## 2024-10-25 PROCEDURE — 78815 PET IMAGE W/CT SKULL-THIGH: CPT | Mod: 26,PS

## 2024-10-25 PROCEDURE — 78815 PET IMAGE W/CT SKULL-THIGH: CPT

## 2024-10-25 PROCEDURE — A9552: CPT

## 2024-11-15 ENCOUNTER — NON-APPOINTMENT (OUTPATIENT)
Age: 74
End: 2024-11-15

## 2024-11-22 ENCOUNTER — APPOINTMENT (OUTPATIENT)
Dept: GASTROENTEROLOGY | Facility: CLINIC | Age: 74
End: 2024-11-22

## 2025-02-24 ENCOUNTER — NON-APPOINTMENT (OUTPATIENT)
Age: 75
End: 2025-02-24

## 2025-02-24 ENCOUNTER — APPOINTMENT (OUTPATIENT)
Dept: GYNECOLOGIC ONCOLOGY | Facility: CLINIC | Age: 75
End: 2025-02-24
Payer: MEDICARE

## 2025-02-24 VITALS
OXYGEN SATURATION: 99 % | HEIGHT: 57 IN | RESPIRATION RATE: 16 BRPM | BODY MASS INDEX: 24.81 KG/M2 | WEIGHT: 115 LBS | SYSTOLIC BLOOD PRESSURE: 150 MMHG | HEART RATE: 80 BPM | DIASTOLIC BLOOD PRESSURE: 75 MMHG

## 2025-02-24 DIAGNOSIS — C52 MALIGNANT NEOPLASM OF VAGINA: ICD-10-CM

## 2025-02-24 PROCEDURE — 99214 OFFICE O/P EST MOD 30 MIN: CPT

## 2025-02-24 PROCEDURE — 99459 PELVIC EXAMINATION: CPT

## 2025-03-10 ENCOUNTER — OUTPATIENT (OUTPATIENT)
Dept: OUTPATIENT SERVICES | Facility: HOSPITAL | Age: 75
LOS: 1 days | End: 2025-03-10
Payer: COMMERCIAL

## 2025-03-10 ENCOUNTER — APPOINTMENT (OUTPATIENT)
Dept: NUCLEAR MEDICINE | Facility: IMAGING CENTER | Age: 75
End: 2025-03-10
Payer: MEDICARE

## 2025-03-10 DIAGNOSIS — C52 MALIGNANT NEOPLASM OF VAGINA: ICD-10-CM

## 2025-03-10 DIAGNOSIS — Z90.710 ACQUIRED ABSENCE OF BOTH CERVIX AND UTERUS: Chronic | ICD-10-CM

## 2025-03-10 PROCEDURE — 78815 PET IMAGE W/CT SKULL-THIGH: CPT | Mod: 26,PS

## 2025-03-10 PROCEDURE — A9552: CPT

## 2025-03-10 PROCEDURE — 78815 PET IMAGE W/CT SKULL-THIGH: CPT

## 2025-04-01 DIAGNOSIS — R92.8 OTHER ABNORMAL AND INCONCLUSIVE FINDINGS ON DIAGNOSTIC IMAGING OF BREAST: ICD-10-CM

## 2025-04-02 ENCOUNTER — NON-APPOINTMENT (OUTPATIENT)
Age: 75
End: 2025-04-02

## 2025-04-03 ENCOUNTER — APPOINTMENT (OUTPATIENT)
Dept: GASTROENTEROLOGY | Facility: CLINIC | Age: 75
End: 2025-04-03
Payer: MEDICARE

## 2025-04-03 VITALS
BODY MASS INDEX: 24.81 KG/M2 | OXYGEN SATURATION: 97 % | DIASTOLIC BLOOD PRESSURE: 66 MMHG | SYSTOLIC BLOOD PRESSURE: 165 MMHG | HEIGHT: 57 IN | TEMPERATURE: 97.4 F | WEIGHT: 115 LBS | HEART RATE: 71 BPM

## 2025-04-03 DIAGNOSIS — R94.8 ABNORMAL RESULTS OF FUNCTION STUDIES OF OTHER ORGANS AND SYSTEMS: ICD-10-CM

## 2025-04-03 DIAGNOSIS — C52 MALIGNANT NEOPLASM OF VAGINA: ICD-10-CM

## 2025-04-03 PROCEDURE — 99204 OFFICE O/P NEW MOD 45 MIN: CPT

## 2025-04-03 PROCEDURE — G2211 COMPLEX E/M VISIT ADD ON: CPT

## 2025-04-04 ENCOUNTER — NON-APPOINTMENT (OUTPATIENT)
Age: 75
End: 2025-04-04

## 2025-04-30 ENCOUNTER — NON-APPOINTMENT (OUTPATIENT)
Age: 75
End: 2025-04-30

## 2025-05-23 ENCOUNTER — APPOINTMENT (OUTPATIENT)
Dept: GASTROENTEROLOGY | Facility: AMBULATORY MEDICAL SERVICES | Age: 75
End: 2025-05-23
Payer: MEDICARE

## 2025-05-23 PROCEDURE — 45331 SIGMOIDOSCOPY AND BIOPSY: CPT

## 2025-05-23 PROCEDURE — 43239 EGD BIOPSY SINGLE/MULTIPLE: CPT | Mod: 59

## 2025-05-23 PROCEDURE — ZZZZZ: CPT

## 2025-08-18 ENCOUNTER — APPOINTMENT (OUTPATIENT)
Dept: GYNECOLOGIC ONCOLOGY | Facility: CLINIC | Age: 75
End: 2025-08-18

## 2025-08-18 DIAGNOSIS — C52 MALIGNANT NEOPLASM OF VAGINA: ICD-10-CM
